# Patient Record
Sex: MALE | Race: BLACK OR AFRICAN AMERICAN | NOT HISPANIC OR LATINO | URBAN - METROPOLITAN AREA
[De-identification: names, ages, dates, MRNs, and addresses within clinical notes are randomized per-mention and may not be internally consistent; named-entity substitution may affect disease eponyms.]

---

## 2018-01-16 ENCOUNTER — INPATIENT (INPATIENT)
Facility: HOSPITAL | Age: 28
LOS: 1 days | Discharge: HOME | End: 2018-01-18
Attending: INTERNAL MEDICINE | Admitting: INTERNAL MEDICINE
Payer: MEDICAID

## 2018-01-16 VITALS
TEMPERATURE: 98 F | WEIGHT: 250 LBS | SYSTOLIC BLOOD PRESSURE: 190 MMHG | RESPIRATION RATE: 20 BRPM | HEIGHT: 73 IN | HEART RATE: 108 BPM | DIASTOLIC BLOOD PRESSURE: 122 MMHG | OXYGEN SATURATION: 99 %

## 2018-01-16 DIAGNOSIS — M54.16 RADICULOPATHY, LUMBAR REGION: ICD-10-CM

## 2018-01-16 DIAGNOSIS — I16.0 HYPERTENSIVE URGENCY: ICD-10-CM

## 2018-01-16 DIAGNOSIS — R74.8 ABNORMAL LEVELS OF OTHER SERUM ENZYMES: ICD-10-CM

## 2018-01-16 LAB
ALBUMIN SERPL ELPH-MCNC: 4.1 G/DL — SIGNIFICANT CHANGE UP (ref 3.3–5)
ALP SERPL-CCNC: 84 U/L — SIGNIFICANT CHANGE UP (ref 40–120)
ALT FLD-CCNC: 38 U/L — SIGNIFICANT CHANGE UP (ref 12–78)
AMPHET UR-MCNC: NEGATIVE — SIGNIFICANT CHANGE UP
ANION GAP SERPL CALC-SCNC: 7 MMOL/L — SIGNIFICANT CHANGE UP (ref 5–17)
APPEARANCE UR: CLEAR — SIGNIFICANT CHANGE UP
APTT BLD: 31.3 SEC — SIGNIFICANT CHANGE UP (ref 27.5–37.4)
AST SERPL-CCNC: 38 U/L — HIGH (ref 15–37)
BARBITURATES UR SCN-MCNC: NEGATIVE — SIGNIFICANT CHANGE UP
BASOPHILS # BLD AUTO: 0.1 K/UL — SIGNIFICANT CHANGE UP (ref 0–0.2)
BASOPHILS NFR BLD AUTO: 1.4 % — SIGNIFICANT CHANGE UP (ref 0–2)
BENZODIAZ UR-MCNC: NEGATIVE — SIGNIFICANT CHANGE UP
BILIRUB SERPL-MCNC: 0.8 MG/DL — SIGNIFICANT CHANGE UP (ref 0.2–1.2)
BILIRUB UR-MCNC: NEGATIVE — SIGNIFICANT CHANGE UP
BLD GP AB SCN SERPL QL: SIGNIFICANT CHANGE UP
BUN SERPL-MCNC: 12 MG/DL — SIGNIFICANT CHANGE UP (ref 7–23)
CALCIUM SERPL-MCNC: 8.8 MG/DL — SIGNIFICANT CHANGE UP (ref 8.5–10.1)
CHLORIDE SERPL-SCNC: 103 MMOL/L — SIGNIFICANT CHANGE UP (ref 96–108)
CK MB BLD-MCNC: 0.1 % — SIGNIFICANT CHANGE UP (ref 0–3.5)
CK MB CFR SERPL CALC: 1.5 NG/ML — SIGNIFICANT CHANGE UP (ref 0.5–3.6)
CK SERPL-CCNC: 1331 U/L — HIGH (ref 26–308)
CO2 SERPL-SCNC: 30 MMOL/L — SIGNIFICANT CHANGE UP (ref 22–31)
COCAINE METAB.OTHER UR-MCNC: NEGATIVE — SIGNIFICANT CHANGE UP
COLOR SPEC: YELLOW — SIGNIFICANT CHANGE UP
CREAT SERPL-MCNC: 1.2 MG/DL — SIGNIFICANT CHANGE UP (ref 0.5–1.3)
DIFF PNL FLD: ABNORMAL
EOSINOPHIL # BLD AUTO: 0.1 K/UL — SIGNIFICANT CHANGE UP (ref 0–0.5)
EOSINOPHIL NFR BLD AUTO: 1.8 % — SIGNIFICANT CHANGE UP (ref 0–6)
EPI CELLS # UR: SIGNIFICANT CHANGE UP
GLUCOSE SERPL-MCNC: 112 MG/DL — HIGH (ref 70–99)
GLUCOSE UR QL: NEGATIVE MG/DL — SIGNIFICANT CHANGE UP
HCT VFR BLD CALC: 43.7 % — SIGNIFICANT CHANGE UP (ref 39–50)
HGB BLD-MCNC: 14.2 G/DL — SIGNIFICANT CHANGE UP (ref 13–17)
INR BLD: 1.18 RATIO — HIGH (ref 0.88–1.16)
KETONES UR-MCNC: NEGATIVE — SIGNIFICANT CHANGE UP
LEUKOCYTE ESTERASE UR-ACNC: NEGATIVE — SIGNIFICANT CHANGE UP
LYMPHOCYTES # BLD AUTO: 1.6 K/UL — SIGNIFICANT CHANGE UP (ref 1–3.3)
LYMPHOCYTES # BLD AUTO: 30.2 % — SIGNIFICANT CHANGE UP (ref 13–44)
MCHC RBC-ENTMCNC: 26.3 PG — LOW (ref 27–34)
MCHC RBC-ENTMCNC: 32.4 GM/DL — SIGNIFICANT CHANGE UP (ref 32–36)
MCV RBC AUTO: 81.1 FL — SIGNIFICANT CHANGE UP (ref 80–100)
METHADONE UR-MCNC: NEGATIVE — SIGNIFICANT CHANGE UP
MONOCYTES # BLD AUTO: 0.4 K/UL — SIGNIFICANT CHANGE UP (ref 0–0.9)
MONOCYTES NFR BLD AUTO: 7.9 % — SIGNIFICANT CHANGE UP (ref 2–14)
NEUTROPHILS # BLD AUTO: 3.2 K/UL — SIGNIFICANT CHANGE UP (ref 1.8–7.4)
NEUTROPHILS NFR BLD AUTO: 58.8 % — SIGNIFICANT CHANGE UP (ref 43–77)
NITRITE UR-MCNC: NEGATIVE — SIGNIFICANT CHANGE UP
OPIATES UR-MCNC: NEGATIVE — SIGNIFICANT CHANGE UP
PCP SPEC-MCNC: SIGNIFICANT CHANGE UP
PCP UR-MCNC: NEGATIVE — SIGNIFICANT CHANGE UP
PH UR: 7 — SIGNIFICANT CHANGE UP (ref 5–8)
PLATELET # BLD AUTO: 346 K/UL — SIGNIFICANT CHANGE UP (ref 150–400)
POTASSIUM SERPL-MCNC: 3.2 MMOL/L — LOW (ref 3.5–5.3)
POTASSIUM SERPL-SCNC: 3.2 MMOL/L — LOW (ref 3.5–5.3)
PROT SERPL-MCNC: 8.7 GM/DL — HIGH (ref 6–8.3)
PROT UR-MCNC: NEGATIVE MG/DL — SIGNIFICANT CHANGE UP
PROTHROM AB SERPL-ACNC: 12.9 SEC — HIGH (ref 9.8–12.7)
RBC # BLD: 5.39 M/UL — SIGNIFICANT CHANGE UP (ref 4.2–5.8)
RBC # FLD: 13.5 % — SIGNIFICANT CHANGE UP (ref 11–15)
RBC CASTS # UR COMP ASSIST: SIGNIFICANT CHANGE UP /HPF (ref 0–4)
SODIUM SERPL-SCNC: 140 MMOL/L — SIGNIFICANT CHANGE UP (ref 135–145)
SP GR SPEC: 1 — LOW (ref 1.01–1.02)
THC UR QL: NEGATIVE — SIGNIFICANT CHANGE UP
TROPONIN I SERPL-MCNC: 0.11 NG/ML — HIGH (ref 0.01–0.04)
UROBILINOGEN FLD QL: NEGATIVE MG/DL — SIGNIFICANT CHANGE UP
WBC # BLD: 5.4 K/UL — SIGNIFICANT CHANGE UP (ref 3.8–10.5)
WBC # FLD AUTO: 5.4 K/UL — SIGNIFICANT CHANGE UP (ref 3.8–10.5)

## 2018-01-16 PROCEDURE — 71045 X-RAY EXAM CHEST 1 VIEW: CPT | Mod: 26

## 2018-01-16 PROCEDURE — 70450 CT HEAD/BRAIN W/O DYE: CPT | Mod: 26

## 2018-01-16 PROCEDURE — 93010 ELECTROCARDIOGRAM REPORT: CPT

## 2018-01-16 PROCEDURE — 99285 EMERGENCY DEPT VISIT HI MDM: CPT

## 2018-01-16 PROCEDURE — 99223 1ST HOSP IP/OBS HIGH 75: CPT

## 2018-01-16 PROCEDURE — 74176 CT ABD & PELVIS W/O CONTRAST: CPT | Mod: 26

## 2018-01-16 RX ORDER — LABETALOL HCL 100 MG
10 TABLET ORAL ONCE
Refills: 0 | Status: COMPLETED | OUTPATIENT
Start: 2018-01-16 | End: 2018-01-16

## 2018-01-16 RX ORDER — IBUPROFEN 200 MG
600 TABLET ORAL EVERY 8 HOURS
Refills: 0 | Status: DISCONTINUED | OUTPATIENT
Start: 2018-01-16 | End: 2018-01-18

## 2018-01-16 RX ORDER — POTASSIUM CHLORIDE 20 MEQ
40 PACKET (EA) ORAL ONCE
Refills: 0 | Status: COMPLETED | OUTPATIENT
Start: 2018-01-16 | End: 2018-01-16

## 2018-01-16 RX ORDER — AMLODIPINE BESYLATE 2.5 MG/1
10 TABLET ORAL DAILY
Refills: 0 | Status: DISCONTINUED | OUTPATIENT
Start: 2018-01-16 | End: 2018-01-18

## 2018-01-16 RX ORDER — IBUPROFEN 200 MG
600 TABLET ORAL ONCE
Refills: 0 | Status: COMPLETED | OUTPATIENT
Start: 2018-01-16 | End: 2018-01-16

## 2018-01-16 RX ORDER — ASPIRIN/CALCIUM CARB/MAGNESIUM 324 MG
325 TABLET ORAL ONCE
Refills: 0 | Status: COMPLETED | OUTPATIENT
Start: 2018-01-16 | End: 2018-01-16

## 2018-01-16 RX ORDER — METHOCARBAMOL 500 MG/1
1 TABLET, FILM COATED ORAL
Qty: 15 | Refills: 0
Start: 2018-01-16 | End: 2018-01-20

## 2018-01-16 RX ORDER — IBUPROFEN 200 MG
1 TABLET ORAL
Qty: 15 | Refills: 0
Start: 2018-01-16 | End: 2018-01-20

## 2018-01-16 RX ORDER — METHOCARBAMOL 500 MG/1
1500 TABLET, FILM COATED ORAL ONCE
Refills: 0 | Status: COMPLETED | OUTPATIENT
Start: 2018-01-16 | End: 2018-01-16

## 2018-01-16 RX ORDER — AMLODIPINE BESYLATE 2.5 MG/1
10 TABLET ORAL ONCE
Refills: 0 | Status: COMPLETED | OUTPATIENT
Start: 2018-01-16 | End: 2018-01-16

## 2018-01-16 RX ORDER — METHOCARBAMOL 500 MG/1
750 TABLET, FILM COATED ORAL THREE TIMES A DAY
Refills: 0 | Status: DISCONTINUED | OUTPATIENT
Start: 2018-01-16 | End: 2018-01-18

## 2018-01-16 RX ORDER — OXYCODONE AND ACETAMINOPHEN 5; 325 MG/1; MG/1
1 TABLET ORAL ONCE
Refills: 0 | Status: DISCONTINUED | OUTPATIENT
Start: 2018-01-16 | End: 2018-01-16

## 2018-01-16 RX ADMIN — Medication 40 MILLIEQUIVALENT(S): at 23:04

## 2018-01-16 RX ADMIN — Medication 0.1 MILLIGRAM(S): at 12:49

## 2018-01-16 RX ADMIN — AMLODIPINE BESYLATE 10 MILLIGRAM(S): 2.5 TABLET ORAL at 16:50

## 2018-01-16 RX ADMIN — METHOCARBAMOL 1500 MILLIGRAM(S): 500 TABLET, FILM COATED ORAL at 12:29

## 2018-01-16 RX ADMIN — Medication 10 MILLIGRAM(S): at 17:22

## 2018-01-16 RX ADMIN — Medication 0.1 MILLIGRAM(S): at 18:59

## 2018-01-16 RX ADMIN — METHOCARBAMOL 750 MILLIGRAM(S): 500 TABLET, FILM COATED ORAL at 23:03

## 2018-01-16 RX ADMIN — Medication 600 MILLIGRAM(S): at 23:13

## 2018-01-16 RX ADMIN — Medication 600 MILLIGRAM(S): at 14:14

## 2018-01-16 RX ADMIN — Medication 325 MILLIGRAM(S): at 17:19

## 2018-01-16 RX ADMIN — AMLODIPINE BESYLATE 10 MILLIGRAM(S): 2.5 TABLET ORAL at 20:09

## 2018-01-16 RX ADMIN — Medication 10 MILLIGRAM(S): at 15:50

## 2018-01-16 RX ADMIN — Medication 600 MILLIGRAM(S): at 12:29

## 2018-01-16 NOTE — H&P ADULT - NSHPLABSRESULTS_GEN_ALL_CORE
14.2   5.4   )-----------( 346      ( 16 Jan 2018 14:37 )             43.7   01-16    140  |  103  |  12  ----------------------------<  112<H>  3.2<L>   |  30  |  1.20    Ca    8.8      16 Jan 2018 14:37    TPro  8.7<H>  /  Alb  4.1  /  TBili  0.8  /  DBili  x   /  AST  38<H>  /  ALT  38  /  AlkPhos  84  01-16  < from: Xray Chest 1 View AP/PA. (01.16.18 @ 16:15) >    IMPRESSION: No acute findings.    < from: CT Renal Stone Hunt (01.16.18 @ 15:39) >      No urolithiasis or hydronephrosis.  ekg- lvh

## 2018-01-16 NOTE — H&P ADULT - ASSESSMENT
27m with flank pain with accelerated Hypertension     IMPROVE VTE Individual Risk Assessment          RISK                                                          Points    [  ] Previous VTE                                                3    [  ] Thrombophilia                                             2    [  ] Lower limb paralysis                                    2        (unable to hold up >15 seconds)      [  ] Current Cancer                                             2         (within 6 months)    [  ] Immobilization > 24 hrs                              1    [  ] ICU/CCU stay > 24 hours                            1    [  ] Age > 60                                                    1    IMPROVE VTE Score _____0____

## 2018-01-16 NOTE — ED ADULT NURSE NOTE - OBJECTIVE STATEMENT
pt received alert and oriented x 4 , pt c/o lower back pain after sneezing and having cold for few days . pt denies medical hx, medication hx .

## 2018-01-16 NOTE — ED ADULT TRIAGE NOTE - CHIEF COMPLAINT QUOTE
patient c/o of lower back pain radiating in the  L legs started 10 days ago , patient denied dysuria no blood in urine , no cough no fever , patient stated he is anxious at this time

## 2018-01-16 NOTE — H&P ADULT - HISTORY OF PRESENT ILLNESS
: 27 years old male walked in c/o left side lower back pain radiates down to this left buttock and left thigh for three days and intensity of pain with movements. Pt denies recent trauma, focal/distal weakness or numbness, uncontrolled urinations or bowel movements, headache, dizziness, blurred visions, light sensitivities, cough, sob, chest pain nausea, vomiting, fever, chills, abd pain, dysuria, hematuria, or irregular bowel movements. in the emergency room he was found to be in Hypertensive emergency

## 2018-01-16 NOTE — ED ADULT NURSE REASSESSMENT NOTE - NS ED NURSE REASSESS COMMENT FT1
Pt appears to be sleeping, VSS, htn,,  AFEBRILE CALL GARNER IN REACH, SAFETY MAINTAINED PT PLACED ON TELE , addressed htn c Dr. Caicedo, no new orders at this time, will continue to monitor.

## 2018-01-16 NOTE — ED PROVIDER NOTE - OBJECTIVE STATEMENT
27 years old male walked in c/o left side lower back pain radiates down to this left buttock and left thigh for three days and intensity of pain with movements. Pt denies recent trauma, focal/distal weakness or numbness, uncontrolled urinations or bowel movements, headache, dizziness, blurred visions, light sensitivities, cough, sob, chest pain nausea, vomiting, fever, chills, abd pain, dysuria, hematuria, or irregular bowel movements.

## 2018-01-16 NOTE — ED PROVIDER NOTE - PROGRESS NOTE DETAILS
bp 202/116 but denies headache, dizziness, blurred visions, light sensitivities, focal/distal weakness or numbness, sob, cough, chest pain, nausea, vomiting, abd pain. Pt sts he does not has hx of hypertension and sts he always gets nervous when he sees a doctor. Pt is still alert and oriented x 3 smiling sts he has no pain or weakness or dizziness bp 180/113 hr 90 now. Dr. Santoro is notified and admit pt.

## 2018-01-16 NOTE — ED PROVIDER NOTE - CONSTITUTIONAL, MLM
normal... Well appearing, well nourished, awake, alert, oriented to person, place, time/situation and in no apparent distress. Speaking in clear full sentences no nasal flaring no shoulders retractions, smiling appear very comfortable

## 2018-01-16 NOTE — ED ADULT NURSE REASSESSMENT NOTE - NS ED NURSE REASSESS COMMENT FT1
pt persist with elevated blood pressure, MD ELLIOTT is aware, blood work and iv started , pt to go for cta,.

## 2018-01-16 NOTE — H&P ADULT - FAMILY HISTORY
Mother  Still living? Unknown  Family history of hypertension, Age at diagnosis: Age Unknown     Sibling  Still living? Unknown  Family history of hypertension, Age at diagnosis: Age Unknown     Uncle  Still living? Unknown  Family history of hypertension, Age at diagnosis: Age Unknown

## 2018-01-16 NOTE — ED PROVIDER NOTE - CARE PLAN
Principal Discharge DX:	Lumbar radiculopathy Principal Discharge DX:	Hypertensive urgency  Secondary Diagnosis:	Lumbar radiculopathy

## 2018-01-17 DIAGNOSIS — E66.9 OBESITY, UNSPECIFIED: ICD-10-CM

## 2018-01-17 LAB
HBA1C BLD-MCNC: 5.7 % — HIGH (ref 4–5.6)
TROPONIN I SERPL-MCNC: 0.1 NG/ML — HIGH (ref 0.01–0.04)
TROPONIN I SERPL-MCNC: 0.11 NG/ML — HIGH (ref 0.01–0.04)
TSH SERPL-MCNC: 1.56 UIU/ML — SIGNIFICANT CHANGE UP (ref 0.36–3.74)

## 2018-01-17 PROCEDURE — 99233 SBSQ HOSP IP/OBS HIGH 50: CPT

## 2018-01-17 PROCEDURE — 93306 TTE W/DOPPLER COMPLETE: CPT | Mod: 26

## 2018-01-17 RX ORDER — HYDROCHLOROTHIAZIDE 25 MG
25 TABLET ORAL DAILY
Refills: 0 | Status: DISCONTINUED | OUTPATIENT
Start: 2018-01-17 | End: 2018-01-18

## 2018-01-17 RX ADMIN — Medication 0.2 MILLIGRAM(S): at 22:42

## 2018-01-17 RX ADMIN — Medication 0.1 MILLIGRAM(S): at 05:34

## 2018-01-17 RX ADMIN — METHOCARBAMOL 750 MILLIGRAM(S): 500 TABLET, FILM COATED ORAL at 14:06

## 2018-01-17 RX ADMIN — AMLODIPINE BESYLATE 10 MILLIGRAM(S): 2.5 TABLET ORAL at 05:34

## 2018-01-17 RX ADMIN — METHOCARBAMOL 750 MILLIGRAM(S): 500 TABLET, FILM COATED ORAL at 22:42

## 2018-01-17 RX ADMIN — Medication 25 MILLIGRAM(S): at 11:12

## 2018-01-17 RX ADMIN — Medication 600 MILLIGRAM(S): at 21:10

## 2018-01-17 RX ADMIN — Medication 600 MILLIGRAM(S): at 08:00

## 2018-01-17 RX ADMIN — Medication 0.2 MILLIGRAM(S): at 20:14

## 2018-01-17 RX ADMIN — METHOCARBAMOL 750 MILLIGRAM(S): 500 TABLET, FILM COATED ORAL at 05:34

## 2018-01-17 RX ADMIN — Medication 600 MILLIGRAM(S): at 20:14

## 2018-01-17 RX ADMIN — Medication 0.1 MILLIGRAM(S): at 14:06

## 2018-01-17 NOTE — CONSULT NOTE ADULT - SUBJECTIVE AND OBJECTIVE BOX
HPI:  HPI:  : 27 years old male walked in c/o left side lower back pain radiates down to this left buttock and left thigh for three days and intensity of pain with movements. Pt denies recent trauma, focal/distal weakness or numbness, uncontrolled urinations or bowel movements, headache, dizziness, blurred visions, light sensitivities, cough, sob, chest pain nausea, vomiting, fever, chills, abd pain, dysuria, hematuria, or irregular bowel movements. in the emergency room he was found to be in Hypertensive emergency (2018 18:10)      Chief Complaint:  Patient is a 27y old  Male who presents with a chief complaint of flank pain (2018 18:10)      Review of Systems:    see above           Social History/Family History  SOCHX:   tobacco,  -  alcohol    FMHX: FA/MO  - contributory       Discussed with:  PMD, Family    Physical Exam:    Vital Signs:  Vital Signs Last 24 Hrs  T(C): 36.6 (2018 04:38), Max: 37.2 (2018 00:34)  T(F): 97.8 (2018 04:38), Max: 98.9 (2018 00:34)  HR: 92 (2018 06:53) (89 - 118)  BP: 138/81 (2018 06:53) (133/79 - 186/115)  BP(mean): --  RR: 16 (2018 06:53) (16 - 18)  SpO2: 100% (2018 06:53) (99% - 100%)  Daily     Daily Weight in k (2018 04:38)  I&O's Summary    2018 07:01  -  2018 07:00  --------------------------------------------------------  IN: 1520 mL / OUT: 0 mL / NET: 1520 mL          Chest:  Full & symmetric excursion, no increased effort, breath sounds clear  Cardiovascular:  Regular rhythm, S1, S2, no murmur/rub/S3/S4, no carotid/femoral/abdominal bruit, radial/pedal pulses 2+, no edema  Abdomen:  Soft, non-tender, non-distended, normoactive bowel sounds, no HSM      Laboratory:                          14.2   5.4   )-----------( 346      ( 2018 14:37 )             43.7     -16    140  |  103  |  12  ----------------------------<  112<H>  3.2<L>   |  30  |  1.20    Ca    8.8      2018 14:37    TPro  8.7<H>  /  Alb  4.1  /  TBili  0.8  /  DBili  x   /  AST  38<H>  /  ALT  38  /  AlkPhos  84        CARDIAC MARKERS ( 2018 16:18 )  .080 ng/mL / x     / x     / x     / x      CARDIAC MARKERS ( 2018 08:52 )  .102 ng/mL / x     / x     / x     / x      CARDIAC MARKERS ( 2018 00:24 )  .114 ng/mL / x     / x     / x     / x      CARDIAC MARKERS ( 2018 16:26 )  .110 ng/mL / x     / 1331 U/L / x     / 1.5 ng/mL      CAPILLARY BLOOD GLUCOSE        LIVER FUNCTIONS - ( 2018 14:37 )  Alb: 4.1 g/dL / Pro: 8.7 gm/dL / ALK PHOS: 84 U/L / ALT: 38 U/L / AST: 38 U/L / GGT: x           PT/INR - ( 2018 14:37 )   PT: 12.9 sec;   INR: 1.18 ratio         PTT - ( 2018 14:37 )  PTT:31.3 sec  Urinalysis Basic - ( 2018 17:36 )    Color: Yellow / Appearance: Clear / S.005 / pH: x  Gluc: x / Ketone: Negative  / Bili: Negative / Urobili: Negative mg/dL   Blood: x / Protein: Negative mg/dL / Nitrite: Negative   Leuk Esterase: Negative / RBC: Occasional /HPF / WBC x   Sq Epi: x / Non Sq Epi: Occasional / Bacteria: x      Assessment:  HTN , elevated SBP  trivial troponin leak  Await echo, if normal EF and no wall motion changes , no in patient stress test, out patient stress test acceptable  Young patient with likely demand ischemia in setting of elevated SBP and CPK.   Await TTE

## 2018-01-17 NOTE — PROGRESS NOTE ADULT - SUBJECTIVE AND OBJECTIVE BOX
Patient is a 27y old  Male who presents with a chief complaint of flank pain (2018 18:10)      INTERVAL HPI/OVERNIGHT EVENTS: no events overnight    MEDICATIONS  (STANDING):  amLODIPine   Tablet 10 milliGRAM(s) Oral daily  cloNIDine 0.1 milliGRAM(s) Oral every 8 hours  hydrochlorothiazide 25 milliGRAM(s) Oral daily  methocarbamol 750 milliGRAM(s) Oral three times a day    MEDICATIONS  (PRN):  ibuprofen  Tablet 600 milliGRAM(s) Oral every 8 hours PRN pain      Allergies    No Known Allergies    Intolerances            Vital Signs Last 24 Hrs  T(C): 36.8 (2018 12:05), Max: 36.8 (2018 21:56)  T(F): 98.3 (2018 12:05), Max: 98.3 (2018 22:40)  HR: 118 (2018 12:05) (82 - 118)  BP: 175/114 (2018 12:05) (175/114 - 217/138)  BP(mean): --  RR: 16 (2018 12:05) (15 - 18)  SpO2: 99% (2018 12:05) (97% - 100%)    PHYSICAL EXAM:  GENERAL: NAD, well-groomed, well-developed  HEAD:  Atraumatic, Normocephalic  EYES: EOMI, PERRLA, conjunctiva and sclera clear  ENMT: No tonsillar erythema, exudates, or enlargement; Moist mucous membranes, Good dentition, No lesions  NECK: Supple, No JVD, Normal thyroid  NERVOUS SYSTEM:  Alert & Oriented X3, Good concentration; Motor Strength 5/5 B/L upper and lower extremities; DTRs 2+ intact and symmetric  CHEST/LUNG: Clear to percussion bilaterally; No rales, rhonchi, wheezing, or rubs  HEART: Regular rate and rhythm; No murmurs, rubs, or gallops  ABDOMEN: Soft, Nontender, Nondistended; Bowel sounds present  EXTREMITIES:  2+ Peripheral Pulses, No clubbing, cyanosis, or edema  LYMPH: No lymphadenopathy noted  SKIN: No rashes or lesions    LABS:                        14.2   5.4   )-----------( 346      ( 2018 14:37 )             43.7     01-16    140  |  103  |  12  ----------------------------<  112<H>  3.2<L>   |  30  |  1.20    Ca    8.8      2018 14:37    TPro  8.7<H>  /  Alb  4.1  /  TBili  0.8  /  DBili  x   /  AST  38<H>  /  ALT  38  /  AlkPhos  84  01-16    PT/INR - ( 2018 14:37 )   PT: 12.9 sec;   INR: 1.18 ratio         PTT - ( 2018 14:37 )  PTT:31.3 sec  Urinalysis Basic - ( 2018 17:36 )    Color: Yellow / Appearance: Clear / S.005 / pH: x  Gluc: x / Ketone: Negative  / Bili: Negative / Urobili: Negative mg/dL   Blood: x / Protein: Negative mg/dL / Nitrite: Negative   Leuk Esterase: Negative / RBC: Occasional /HPF / WBC x   Sq Epi: x / Non Sq Epi: Occasional / Bacteria: x      CAPILLARY BLOOD GLUCOSE          RADIOLOGY & ADDITIONAL TESTS:    Imaging Personally Reviewed:  [ ] YES  [ ] NO    Consultant(s) Notes Reviewed:  [ ] YES  [ ] NO    Care Discussed with Consultants/Other Providers [ ] YES  [ ] NO

## 2018-01-18 ENCOUNTER — EMERGENCY (EMERGENCY)
Facility: HOSPITAL | Age: 28
LOS: 0 days | Discharge: HOME | End: 2018-01-18
Attending: EMERGENCY MEDICINE
Payer: MEDICAID

## 2018-01-18 ENCOUNTER — TRANSCRIPTION ENCOUNTER (OUTPATIENT)
Age: 28
End: 2018-01-18

## 2018-01-18 VITALS
SYSTOLIC BLOOD PRESSURE: 137 MMHG | WEIGHT: 255.07 LBS | OXYGEN SATURATION: 99 % | HEIGHT: 73 IN | TEMPERATURE: 98 F | DIASTOLIC BLOOD PRESSURE: 84 MMHG | HEART RATE: 88 BPM | RESPIRATION RATE: 16 BRPM

## 2018-01-18 VITALS
RESPIRATION RATE: 16 BRPM | SYSTOLIC BLOOD PRESSURE: 111 MMHG | TEMPERATURE: 97 F | DIASTOLIC BLOOD PRESSURE: 61 MMHG | OXYGEN SATURATION: 100 % | HEART RATE: 89 BPM

## 2018-01-18 VITALS
HEART RATE: 98 BPM | DIASTOLIC BLOOD PRESSURE: 83 MMHG | OXYGEN SATURATION: 99 % | RESPIRATION RATE: 16 BRPM | TEMPERATURE: 99 F | SYSTOLIC BLOOD PRESSURE: 137 MMHG

## 2018-01-18 DIAGNOSIS — G58.9 MONONEUROPATHY, UNSPECIFIED: ICD-10-CM

## 2018-01-18 DIAGNOSIS — R20.0 ANESTHESIA OF SKIN: ICD-10-CM

## 2018-01-18 DIAGNOSIS — I10 ESSENTIAL (PRIMARY) HYPERTENSION: ICD-10-CM

## 2018-01-18 LAB — GLUCOSE BLDC GLUCOMTR-MCNC: 95 MG/DL — SIGNIFICANT CHANGE UP (ref 70–99)

## 2018-01-18 PROCEDURE — 99239 HOSP IP/OBS DSCHRG MGMT >30: CPT

## 2018-01-18 PROCEDURE — 93010 ELECTROCARDIOGRAM REPORT: CPT

## 2018-01-18 PROCEDURE — 99284 EMERGENCY DEPT VISIT MOD MDM: CPT

## 2018-01-18 RX ORDER — AMLODIPINE BESYLATE 2.5 MG/1
1 TABLET ORAL
Qty: 30 | Refills: 0
Start: 2018-01-18 | End: 2018-02-16

## 2018-01-18 RX ADMIN — Medication 600 MILLIGRAM(S): at 06:30

## 2018-01-18 RX ADMIN — METHOCARBAMOL 750 MILLIGRAM(S): 500 TABLET, FILM COATED ORAL at 05:35

## 2018-01-18 RX ADMIN — AMLODIPINE BESYLATE 10 MILLIGRAM(S): 2.5 TABLET ORAL at 05:35

## 2018-01-18 RX ADMIN — Medication 0.2 MILLIGRAM(S): at 06:53

## 2018-01-18 RX ADMIN — Medication 600 MILLIGRAM(S): at 05:35

## 2018-01-18 RX ADMIN — Medication 25 MILLIGRAM(S): at 05:35

## 2018-01-18 NOTE — ED PROVIDER NOTE - MEDICAL DECISION MAKING DETAILS
Patient with right arm weakness/numbness after sleeping on arm, symptoms resolved.  VSS.  Symptoms consistent with nerve compression, patient does not describe clear symptoms of wrist drop, but based on events, there in minimal suspicion for TIA/CVA.  EKG unchanged from previous admission, no arrhythmia, blood glucose wnl.  Patient advised to monitor for any further neurologic symptoms and should return immediately for any unprovoked numbness or weakness, but for now feel that he is safe to discharge. Patient with right arm weakness/numbness after sleeping on arm, symptoms resolved.  VSS.  Symptoms consistent with nerve compression, patient does not describe clear symptoms of wrist drop, but based on events, there in minimal suspicion for TIA/CVA.  EKG without arrhythmia, blood glucose wnl.  Patient advised to monitor for any further neurologic symptoms and should return immediately for any unprovoked numbness or weakness, but for now feel that he is safe to discharge. Patient with right arm weakness/numbness after sleeping on arm, symptoms resolved.  VSS.  Symptoms consistent with nerve compression, patient does not describe clear symptoms of wrist drop, but still very likely had pinched nerve based on events, there in minimal suspicion for TIA/CVA.  EKG without arrhythmia, blood glucose wnl.  Patient advised to monitor for any further neurologic symptoms and should return immediately for any unprovoked numbness or weakness, but for now feel that he is safe to discharge.

## 2018-01-18 NOTE — DISCHARGE NOTE ADULT - CARE PROVIDER_API CALL
Lele Arenas), Internal Medicine  [address] [facility]  [address]  [place] [address]  Phone: [phone]  Fax: [phone]    Manoj Painter), Cardiology  [address] [facility]  [address] [place] [address]  Phone: [phone]  Fax: [phone]

## 2018-01-18 NOTE — PROGRESS NOTE ADULT - SUBJECTIVE AND OBJECTIVE BOX
Assessment:  HTN , elevated SBP  trivial troponin leak  Await echo, if normal EF and no wall motion changes , no in patient stress test, out patient stress test acceptable  Young patient with likely demand ischemia in setting of elevated SBP and CPK.   TTE normal  DC

## 2018-01-18 NOTE — DISCHARGE NOTE ADULT - MEDICATION SUMMARY - MEDICATIONS TO TAKE
I will START or STAY ON the medications listed below when I get home from the hospital:     mg oral tablet  -- 1 tab(s) by mouth every 8 hours as needed for pain  -- Do not take this drug if you are pregnant.  It is very important that you take or use this exactly as directed.  Do not skip doses or discontinue unless directed by your doctor.  May cause drowsiness or dizziness.  Obtain medical advice before taking any non-prescription drugs as some may affect the action of this medication.  Take with food or milk.    -- Indication: For Lumbar radiculopathy    cloNIDine 0.2 mg oral tablet  -- 1 tab(s) by mouth every 8 hours  -- Indication: For Hypertensive urgency    amLODIPine 10 mg oral tablet  -- 1 tab(s) by mouth once a day  -- Indication: For Hypertensive urgency    hydroCHLOROthiazide 25 mg oral tablet  -- 1 tab(s) by mouth once a day  -- Indication: For Hypertensive urgency    Robaxin-750 oral tablet  -- 1 tab(s) by mouth 3 times a day   -- May cause drowsiness.  Alcohol may intensify this effect.  Use care when operating dangerous machinery.    -- Indication: For Lumbar radiculopathy

## 2018-01-18 NOTE — ED PROVIDER NOTE - OBJECTIVE STATEMENT
Pertinent PMH/PSH/FHx/SHx and Review of Systems contained within:  Patient presents to the ED for right arm numbness.  Patient is a well appearing male, recently admitted for lower back pain and new onset HTN.  Patient says that he was discharged this morning (results of recent admission reviewed, labs, echo, CT head).  Went home and took robaxin for lower back pain which made him sleepy.  Says that he had been sleeping on his right side compressing his right arm.  On waking up felt numbness and weakness of the right arm, was able to flex at elbow, but hand and wrist felt weak, had difficulty flexing AND extending at the wrist and moving his fingers.  Sensation lasted about 20 minutes before resolving.  Denies any other weakness, numbness, speech difficulty, headache, or vision change.  Says that he panicked and came back to ER.    Relevant PMHx/SHx/SOCHx/FAMH:  HTN, anxiety  Patient denies EtOH/tobacco/illicit substance use.  No PMD    ROS: No fever/chills, No headache/photophobia/eye pain/changes in vision, No ear pain/sore throat/dysphagia, No chest pain/palpitations, no SOB/cough/wheeze/stridor, No abdominal pain, No N/V/D/melena, no dysuria/frequency/discharge, No neck/back pain, no rash, no changes in neurological status/function.

## 2018-01-18 NOTE — ED PROVIDER NOTE - PHYSICAL EXAMINATION
Gen: Alert, NAD, well appearing  Head: NC, AT, PERRL, EOMI, normal lids/conjunctiva  ENT: normal hearing, patent oropharynx without erythema/exudate, uvula midline  Neck: +supple, no tenderness/meningismus/JVD, +Trachea midline  Pulm: Bilateral BS, normal resp effort, no wheeze/stridor/retractions  CV: RRR, no M/R/G, +dist pulses  Abd: soft, NT/ND, +BS, no hepatosplenomegaly  Mskel: no edema/erythema/cyanosis  Skin: no rash, warm/dry

## 2018-01-18 NOTE — DISCHARGE NOTE ADULT - PLAN OF CARE
c/w medication, get bp machine from pharmacy measure your pressure daily c/w medication, get bp machine from pharmacy measure your pressure daily. Bring log to pcp  f/u pcp, heart healthy diet

## 2018-01-18 NOTE — DISCHARGE NOTE ADULT - CARE PLAN
Principal Discharge DX:	Hypertensive urgency  Goal:	c/w medication, get bp machine from pharmacy measure your pressure daily  Assessment and plan of treatment:	c/w medication, get bp machine from pharmacy measure your pressure daily. Bring log to pcp  f/u pcp, heart healthy diet  Secondary Diagnosis:	Elevated troponin  Secondary Diagnosis:	Lumbar radiculopathy  Secondary Diagnosis:	Obesity (BMI 35.0-39.9 without comorbidity)

## 2018-01-18 NOTE — DISCHARGE NOTE ADULT - HOSPITAL COURSE
27m with back pain with accelerated Hypertension          Problem/Plan - 1:  ·  Problem: Hypertensive urgency.  Plan: clonidine  norvasc  hctz   monitor Hypertension at home       Problem/Plan - 2:  ·  Problem: Lumbar radiculopathy.  Plan: motrin/robaxin.      Problem/Plan - 3:  ·  Problem: Elevated troponin.  Plan: monitor trops  echocardiagram  cardio eval.      Problem/Plan - 4:  ·  Problem: Obesity (BMI 35.0-39.9 without comorbidity).  Plan: heart heatlhy diet    Attending Attestation:   I was physically present for the key portions of the evaluation and management (E/M) service provided.  I agree with the above history, physical, and plan which I have reviewed and edited where appropriate.     45 minutes spent on total dc encounter; more than 50% of the visit was spent counseling and/or coordinating care by the attending physician.

## 2018-01-18 NOTE — DISCHARGE NOTE ADULT - PATIENT PORTAL LINK FT
“You can access the Follow[employer] Patient Portal, offered by [facility], by registering with the following website: [email or web address]”

## 2018-01-18 NOTE — ED ADULT TRIAGE NOTE - CHIEF COMPLAINT QUOTE
I got d/c from here today , diagnosis :sciatical and HTN , I went home , slept , I woke up with my right arm numb and I went int panic mode   onset 5: 45 pm today , facial symmetry , no drifting , b/l strength in both extremities . stated history of panic attack /anxiety disorder  Numbness resolved now

## 2018-01-21 LAB
METANEPHRINE, PL: 33 PG/ML — SIGNIFICANT CHANGE UP (ref 0–62)
NORMETANEPHRINE, PL: 105 PG/ML — SIGNIFICANT CHANGE UP (ref 0–145)

## 2018-01-22 DIAGNOSIS — I16.0 HYPERTENSIVE URGENCY: ICD-10-CM

## 2018-01-22 DIAGNOSIS — I24.9 ACUTE ISCHEMIC HEART DISEASE, UNSPECIFIED: ICD-10-CM

## 2018-01-22 DIAGNOSIS — Z82.49 FAMILY HISTORY OF ISCHEMIC HEART DISEASE AND OTHER DISEASES OF THE CIRCULATORY SYSTEM: ICD-10-CM

## 2018-01-22 DIAGNOSIS — M54.16 RADICULOPATHY, LUMBAR REGION: ICD-10-CM

## 2018-01-22 DIAGNOSIS — Z28.21 IMMUNIZATION NOT CARRIED OUT BECAUSE OF PATIENT REFUSAL: ICD-10-CM

## 2018-01-22 DIAGNOSIS — E66.9 OBESITY, UNSPECIFIED: ICD-10-CM

## 2018-01-22 DIAGNOSIS — I10 ESSENTIAL (PRIMARY) HYPERTENSION: ICD-10-CM

## 2018-02-20 ENCOUNTER — APPOINTMENT (OUTPATIENT)
Dept: INTERNAL MEDICINE | Facility: CLINIC | Age: 28
End: 2018-02-20
Payer: SELF-PAY

## 2018-02-20 VITALS
DIASTOLIC BLOOD PRESSURE: 80 MMHG | RESPIRATION RATE: 12 BRPM | SYSTOLIC BLOOD PRESSURE: 148 MMHG | OXYGEN SATURATION: 98 % | HEART RATE: 96 BPM

## 2018-02-20 VITALS — SYSTOLIC BLOOD PRESSURE: 154 MMHG | DIASTOLIC BLOOD PRESSURE: 86 MMHG

## 2018-02-20 VITALS — WEIGHT: 265 LBS | BODY MASS INDEX: 35.12 KG/M2 | HEIGHT: 73 IN

## 2018-02-20 VITALS — SYSTOLIC BLOOD PRESSURE: 150 MMHG | DIASTOLIC BLOOD PRESSURE: 88 MMHG

## 2018-02-20 DIAGNOSIS — Z78.9 OTHER SPECIFIED HEALTH STATUS: ICD-10-CM

## 2018-02-20 DIAGNOSIS — Z82.49 FAMILY HISTORY OF ISCHEMIC HEART DISEASE AND OTHER DISEASES OF THE CIRCULATORY SYSTEM: ICD-10-CM

## 2018-02-20 DIAGNOSIS — R74.8 ABNORMAL LEVELS OF OTHER SERUM ENZYMES: ICD-10-CM

## 2018-02-20 DIAGNOSIS — Z86.59 PERSONAL HISTORY OF OTHER MENTAL AND BEHAVIORAL DISORDERS: ICD-10-CM

## 2018-02-20 DIAGNOSIS — Z83.3 FAMILY HISTORY OF DIABETES MELLITUS: ICD-10-CM

## 2018-02-20 PROCEDURE — 90686 IIV4 VACC NO PRSV 0.5 ML IM: CPT

## 2018-02-20 PROCEDURE — 99204 OFFICE O/P NEW MOD 45 MIN: CPT | Mod: 25

## 2018-02-20 PROCEDURE — G0008: CPT

## 2018-03-19 ENCOUNTER — APPOINTMENT (OUTPATIENT)
Dept: INTERNAL MEDICINE | Facility: CLINIC | Age: 28
End: 2018-03-19
Payer: SELF-PAY

## 2018-03-19 VITALS — DIASTOLIC BLOOD PRESSURE: 60 MMHG | SYSTOLIC BLOOD PRESSURE: 140 MMHG | RESPIRATION RATE: 12 BRPM | HEART RATE: 104 BPM

## 2018-03-19 VITALS — BODY MASS INDEX: 32.59 KG/M2 | WEIGHT: 247 LBS

## 2018-03-19 VITALS — SYSTOLIC BLOOD PRESSURE: 142 MMHG | DIASTOLIC BLOOD PRESSURE: 70 MMHG

## 2018-03-19 PROCEDURE — 99214 OFFICE O/P EST MOD 30 MIN: CPT | Mod: 25

## 2018-03-19 PROCEDURE — 36415 COLL VENOUS BLD VENIPUNCTURE: CPT

## 2018-03-20 LAB
ANION GAP SERPL CALC-SCNC: 19 MMOL/L
BUN SERPL-MCNC: 26 MG/DL
CALCIUM SERPL-MCNC: 10 MG/DL
CHLORIDE SERPL-SCNC: 93 MMOL/L
CO2 SERPL-SCNC: 27 MMOL/L
CREAT SERPL-MCNC: 1.46 MG/DL
GLUCOSE SERPL-MCNC: 98 MG/DL
MAGNESIUM SERPL-MCNC: 2.5 MG/DL
POTASSIUM SERPL-SCNC: 3.9 MMOL/L
SODIUM SERPL-SCNC: 139 MMOL/L

## 2018-05-16 NOTE — ED PROVIDER NOTE - NEUROLOGICAL COORDINATION
After Visit Summary   5/16/2018    Jordan Barnett    MRN: [id number]           Patient Information     Date Of Birth          1963        Visit Information        Provider Department      5/16/2018 9:00 AM Lucita Goetz MD [facility]        Today's Diagnoses     Other fracture of left patella, subsequent encounter for closed fracture with routine healing    -  1      Care Instructions    Plan:  - Today's Plan of Care:  Work Restrictions    Follow Up: as needed    If you have any further questions for your physician or physician s care team you can call [phone] and use option 3 to leave a voice message. Calls received during business hours will be returned same day.            Follow-ups after your visit        Who to contact     If you have questions or need follow up information about today's clinic visit or your schedule please contact [facility] directly at [phone].  Normal or non-critical lab and imaging results will be communicated to you by [employer], letter or phone within 4 business days after the clinic has received the results. If you do not hear from us within 7 days, please contact the clinic through [employer]itekh[employer] or phone. If you have a critical or abnormal lab result, we will notify you by phone as soon as possible.  Submit refill requests through [employer] or call your pharmacy and they will forward the refill request to us. Please allow 3 business days for your refill to be completed.          Additional Information About Your Visit        MyChart Information     [employer] gives you secure access to your electronic health record. If you see a primary care provider, you can also send messages to your care team and make appointments. If you have questions, please call your primary care clinic.  If you do not have a primary care provider, please call [phone] and they will assist you.        Care EveryWhere ID      "This is your Care EveryWhere ID. This could be used by other organizations to access your [facility] medical records  [id number]        Your Vitals Were     Height BMI (Body Mass Index)                5' 10.5\" (1.791 m) 21.46 kg/m2           Blood Pressure from Last 3 Encounters:   05/16/18 118/62   04/04/18 130/80   02/21/18 128/84    Weight from Last 3 Encounters:   05/16/18 151 lb 11.2 oz (68.8 kg)   04/04/18 156 lb 8 oz (71 kg)   02/21/18 160 lb (72.6 kg)               Primary Care Provider Office Phone # Fax #    Juan Antonio Chadd Flanagan [phone] [address] [facility] [address]        Equal Access to Services     SWAPNA REAL : Hadii aad ku hadasho Sovanessa, waaxda luqadaha, qaybta kaalmada adeegyada, shelly salas . So [facility] [phone].    ATENCIÓN: Si habla español, tiene a eaton disposición servicios gratuitos de asistencia lingüística. Avelina al [phone].    We comply with applicable federal civil rights laws and [place] laws. We do not discriminate on the basis of race, color, national origin, age, disability, sex, sexual orientation, or gender identity.            Thank you!     Thank you for choosing [facility]  for your care. Our goal is always to provide you with excellent care. Hearing back from our patients is one way we can continue to improve our services. Please take a few minutes to complete the written survey that you may receive in the mail after your visit with us. Thank you!             Your Updated Medication List - Protect others around you: Learn how to safely use, store and throw away your medicines at www.disposemymeds.org.          This list is accurate as of 5/16/18  9:34 AM.  Always use your most recent med list.                   Brand Name Dispense Instructions for use Diagnosis    GEODON 40 MG capsule   Generic drug:  ziprasidone      Take 2 capsules (80 mg) by mouth At " Bedtime        lamoTRIgine 100 MG tablet    LaMICtal    60 tablet    1 tablet (100 mg) 2 times daily        losartan 100 MG tablet    COZAAR    90 tablet    Take 1 tablet (100 mg) by mouth daily    Essential hypertension with goal blood pressure less than 140/90, CKD (chronic kidney disease) stage 3, GFR 30-59 ml/min       MULTIVITAMIN ADULT PO           order for DME     1 Device    Equipment being ordered: hinged knee brace    Other closed fracture of left patella, initial encounter       triamcinolone 0.1 % ointment    KENALOG    30 g    Apply sparingly to affected area two times daily for 14 days.    Eczema, unspecified type       TYLENOL 500 MG tablet   Generic drug:  acetaminophen      Take 1-2 tablets by mouth every 6 hours as needed.           normal

## 2018-05-30 ENCOUNTER — APPOINTMENT (OUTPATIENT)
Dept: INTERNAL MEDICINE | Facility: CLINIC | Age: 28
End: 2018-05-30
Payer: COMMERCIAL

## 2018-05-30 VITALS — BODY MASS INDEX: 32.32 KG/M2 | WEIGHT: 245 LBS

## 2018-05-30 VITALS — DIASTOLIC BLOOD PRESSURE: 76 MMHG | RESPIRATION RATE: 12 BRPM | SYSTOLIC BLOOD PRESSURE: 138 MMHG | HEART RATE: 120 BPM

## 2018-05-30 VITALS — SYSTOLIC BLOOD PRESSURE: 126 MMHG | HEART RATE: 108 BPM | DIASTOLIC BLOOD PRESSURE: 72 MMHG

## 2018-05-30 PROCEDURE — 99214 OFFICE O/P EST MOD 30 MIN: CPT | Mod: 25

## 2018-05-30 PROCEDURE — 36415 COLL VENOUS BLD VENIPUNCTURE: CPT

## 2018-05-30 NOTE — REVIEW OF SYSTEMS
[Recent Change In Weight] : ~T recent weight change [Negative] : Heme/Lymph [Abdominal Pain] : no abdominal pain [Nausea] : no nausea [Constipation] : no constipation [Diarrhea] : no diarrhea [Vomiting] : no vomiting [Heartburn] : no heartburn [Melena] : no melena [Skin Rash] : no skin rash

## 2018-05-30 NOTE — PHYSICAL EXAM
[No Acute Distress] : no acute distress [Well Nourished] : well nourished [Well Developed] : well developed [Well-Appearing] : well-appearing [Normal Sclera/Conjunctiva] : normal sclera/conjunctiva [PERRL] : pupils equal round and reactive to light [EOMI] : extraocular movements intact [Normal Outer Ear/Nose] : the outer ears and nose were normal in appearance [Normal Oropharynx] : the oropharynx was normal [No JVD] : no jugular venous distention [Supple] : supple [No Lymphadenopathy] : no lymphadenopathy [Thyroid Normal, No Nodules] : the thyroid was normal and there were no nodules present [No Respiratory Distress] : no respiratory distress  [Clear to Auscultation] : lungs were clear to auscultation bilaterally [No Accessory Muscle Use] : no accessory muscle use [Normal Rate] : normal rate  [Regular Rhythm] : with a regular rhythm [Normal S1, S2] : normal S1 and S2 [No Carotid Bruits] : no carotid bruits [No Abdominal Bruit] : a ~M bruit was not heard ~T in the abdomen [Pedal Pulses Present] : the pedal pulses are present [No Edema] : there was no peripheral edema [No Palpable Aorta] : no palpable aorta [Soft] : abdomen soft [Non Tender] : non-tender [Non-distended] : non-distended [No Masses] : no abdominal mass palpated [No HSM] : no HSM [Normal Bowel Sounds] : normal bowel sounds [Normal Posterior Cervical Nodes] : no posterior cervical lymphadenopathy [Normal Anterior Cervical Nodes] : no anterior cervical lymphadenopathy [No CVA Tenderness] : no CVA  tenderness [No Spinal Tenderness] : no spinal tenderness [No Joint Swelling] : no joint swelling [Grossly Normal Strength/Tone] : grossly normal strength/tone [No Rash] : no rash [Normal Gait] : normal gait [Coordination Grossly Intact] : coordination grossly intact [No Focal Deficits] : no focal deficits [Speech Grossly Normal] : speech grossly normal [Memory Grossly Normal] : memory grossly normal [Normal Affect] : the affect was normal [Alert and Oriented x3] : oriented to person, place, and time [Normal Mood] : the mood was normal [Normal Insight/Judgement] : insight and judgment were intact [de-identified] : 1-2/6 syst murmur at the rusb

## 2018-05-30 NOTE — HISTORY OF PRESENT ILLNESS
[FreeTextEntry1] : htn, lumbar radic, obesity, hypokalemia, el cr [de-identified] : Pt is a 28 y/o male with a hx of asthma - reports that he grew out of it, ptsd in the past who s/p admission to [facility] 2/16-2/18 with sciatica and hypertensive emergency.\par BP improved on rx - has been taking clonidine, amlodipine, hctz\par Has reduced sodium in the diet. not a smoker, occ caffeine, occ etoh. Has been exercising.  Has been trying to hydrate.\par + some wt loss - mostly stable.\par BP at home has been in the 120s-130s.  goes down with serial checking\par \par Reprots that the back has been doing well.  has been doing yoga and stretches.  has been working out.  .\par \par continues not to have any asthma sx.\par \par No noted CV sx. \par No GI sx.  no more constipation.  \par No urinary sx.\par No other noted musculoskeletal sx.\par No other noted neuro sx. \par \par had flu vaccine

## 2018-05-31 LAB
ANION GAP SERPL CALC-SCNC: 16 MMOL/L
BUN SERPL-MCNC: 9 MG/DL
CALCIUM SERPL-MCNC: 10 MG/DL
CHLORIDE SERPL-SCNC: 100 MMOL/L
CO2 SERPL-SCNC: 27 MMOL/L
CREAT SERPL-MCNC: 1.25 MG/DL
GLUCOSE SERPL-MCNC: 125 MG/DL
HBA1C MFR BLD HPLC: 5.6 %
MAGNESIUM SERPL-MCNC: 2.1 MG/DL
POTASSIUM SERPL-SCNC: 4.4 MMOL/L
SODIUM SERPL-SCNC: 143 MMOL/L

## 2018-06-21 ENCOUNTER — RX RENEWAL (OUTPATIENT)
Age: 28
End: 2018-06-21

## 2018-07-22 ENCOUNTER — RX RENEWAL (OUTPATIENT)
Age: 28
End: 2018-07-22

## 2018-09-25 ENCOUNTER — RX RENEWAL (OUTPATIENT)
Age: 28
End: 2018-09-25

## 2018-09-30 ENCOUNTER — APPOINTMENT (OUTPATIENT)
Dept: INTERNAL MEDICINE | Facility: CLINIC | Age: 28
End: 2018-09-30

## 2018-10-22 ENCOUNTER — RX RENEWAL (OUTPATIENT)
Age: 28
End: 2018-10-22

## 2018-10-24 ENCOUNTER — APPOINTMENT (OUTPATIENT)
Dept: INTERNAL MEDICINE | Facility: CLINIC | Age: 28
End: 2018-10-24
Payer: SELF-PAY

## 2018-10-24 ENCOUNTER — NON-APPOINTMENT (OUTPATIENT)
Age: 28
End: 2018-10-24

## 2018-10-24 VITALS — DIASTOLIC BLOOD PRESSURE: 70 MMHG | SYSTOLIC BLOOD PRESSURE: 110 MMHG

## 2018-10-24 VITALS
HEART RATE: 92 BPM | SYSTOLIC BLOOD PRESSURE: 90 MMHG | BODY MASS INDEX: 30.35 KG/M2 | WEIGHT: 230 LBS | RESPIRATION RATE: 12 BRPM | DIASTOLIC BLOOD PRESSURE: 64 MMHG

## 2018-10-24 VITALS — DIASTOLIC BLOOD PRESSURE: 60 MMHG | SYSTOLIC BLOOD PRESSURE: 96 MMHG

## 2018-10-24 DIAGNOSIS — Z78.9 OTHER SPECIFIED HEALTH STATUS: ICD-10-CM

## 2018-10-24 PROCEDURE — 93000 ELECTROCARDIOGRAM COMPLETE: CPT

## 2018-10-24 PROCEDURE — 99395 PREV VISIT EST AGE 18-39: CPT

## 2018-10-24 NOTE — REVIEW OF SYSTEMS
[Recent Change In Weight] : ~T recent weight change [Negative] : Heme/Lymph [Abdominal Pain] : no abdominal pain [Nausea] : no nausea [Constipation] : no constipation [Diarrhea] : no diarrhea [Vomiting] : no vomiting [Heartburn] : no heartburn [Melena] : no melena [Skin Rash] : no skin rash [FreeTextEntry2] : some wt loss

## 2018-10-24 NOTE — HISTORY OF PRESENT ILLNESS
[FreeTextEntry1] : htn, lumbar radic, obesity, hypokalemia, el cr [de-identified] : Pt is a 29 y/o male with a hx of asthma - reports that he grew out of it, ptsd in the past who s/p admission to [facility] 2/16-2/18 with sciatica and hypertensive emergency.\par \par BP improved on rx - has been taking clonidine, amlodipine, hctz\par Has reduced sodium in the diet. not a smoker, occ caffeine, occ etoh. Has been exercising.  Has been trying to hydrate.\par + some wt loss - mostly stable.\par BP at home has been in the 120s-130s.  goes down with serial checking\par \par Reprots that the back has been doing well.  has been doing yoga and stretches.  has been working out.  .\par \par continues not to have any asthma sx.\par \par No noted CV sx. \par No GI sx.  no more constipation.  \par No urinary sx.\par No other noted musculoskeletal sx.\par No other noted neuro sx. \par \par had flu vaccine

## 2018-10-24 NOTE — ASSESSMENT
[FreeTextEntry1] : Discussed with the patient health care maintenance.  \par Discussed age and condition appropriate vaccinations.  \par Discussed age appropriate cancer screening testing as indicated, including colon cancer screening/colonoscopy, prostate cancer screening/PSA testing, testicular cancer screening/self exam and skin cancer screening.  \par Discussed protection from the sun.  \par Discussed healthy diet, exercise and weight control for health.\par Discussed healthy habits including abstaining from smoking and drugs and abstention/moderation with alcohol.\par Discussed routine regular ophthalmology and dental follow up.\par Routine labs discussed with the patient.  He defers to get when he has insurance\par \par Flu vaccine deferred - to get in pharmacy

## 2018-10-24 NOTE — HISTORY OF PRESENT ILLNESS
[FreeTextEntry1] : htn, lumbar radic, obesity, hypokalemia, el cr [de-identified] : Pt is a 27 y/o male with a hx of asthma - reports that he grew out of it, ptsd in the past who s/p admission to [facility] 2/16-2/18 with sciatica and hypertensive emergency.\par \par BP improved on rx - has been taking clonidine, amlodipine, hctz\par Has reduced sodium in the diet. not a smoker, occ caffeine, occ etoh. Has been exercising.  Has been trying to hydrate.\par + some wt loss - mostly stable.\par BP at home has been in the 120s-130s.  goes down with serial checking\par \par Reprots that the back has been doing well.  has been doing yoga and stretches.  has been working out.  .\par \par continues not to have any asthma sx.\par \par No noted CV sx. \par No GI sx.  no more constipation.  \par No urinary sx.\par No other noted musculoskeletal sx.\par No other noted neuro sx. \par \par had flu vaccine

## 2018-10-24 NOTE — HEALTH RISK ASSESSMENT
[No falls in past year] : Patient reported no falls in the past year [None] : None [Alone] : lives alone [Employed] : employed [Single] : single [Feels Safe at Home] : Feels safe at home [Fully functional (bathing, dressing, toileting, transferring, walking, feeding)] : Fully functional (bathing, dressing, toileting, transferring, walking, feeding) [Fully functional (using the telephone, shopping, preparing meals, housekeeping, doing laundry, using] : Fully functional and needs no help or supervision to perform IADLs (using the telephone, shopping, preparing meals, housekeeping, doing laundry, using transportation, managing medications and managing finances) [Smoke Detector] : smoke detector [Carbon Monoxide Detector] : carbon monoxide detector [Seat Belt] :  uses seat belt [Sunscreen] : uses sunscreen [Change in mental status noted] : No change in mental status noted [High Risk Behavior] : no high risk behavior [Reports changes in hearing] : Reports no changes in hearing [Reports changes in vision] : Reports no changes in vision [Reports changes in dental health] : Reports no changes in dental health [de-identified] : roomates [] : No [0] : 2) Feeling down, depressed, or hopeless: Not at all (0) [[id number]] : 0

## 2018-10-24 NOTE — PHYSICAL EXAM
[No Acute Distress] : no acute distress [Well Nourished] : well nourished [Well Developed] : well developed [Well-Appearing] : well-appearing [Normal Sclera/Conjunctiva] : normal sclera/conjunctiva [PERRL] : pupils equal round and reactive to light [EOMI] : extraocular movements intact [Normal Outer Ear/Nose] : the outer ears and nose were normal in appearance [Normal Oropharynx] : the oropharynx was normal [Normal TMs] : both tympanic membranes were normal [Normal Nasal Mucosa] : the nasal mucosa was normal [No JVD] : no jugular venous distention [Supple] : supple [No Lymphadenopathy] : no lymphadenopathy [Thyroid Normal, No Nodules] : the thyroid was normal and there were no nodules present [No Respiratory Distress] : no respiratory distress  [Clear to Auscultation] : lungs were clear to auscultation bilaterally [No Accessory Muscle Use] : no accessory muscle use [Normal Rate] : normal rate  [Regular Rhythm] : with a regular rhythm [Normal S1, S2] : normal S1 and S2 [No Murmur] : no murmur heard [No Carotid Bruits] : no carotid bruits [No Abdominal Bruit] : a ~M bruit was not heard ~T in the abdomen [Pedal Pulses Present] : the pedal pulses are present [No Edema] : there was no peripheral edema [No Palpable Aorta] : no palpable aorta [Soft] : abdomen soft [Non Tender] : non-tender [Non-distended] : non-distended [No Masses] : no abdominal mass palpated [No HSM] : no HSM [Normal Bowel Sounds] : normal bowel sounds [No Hernias] : no hernias [Penis Abnormality] : normal uncircumcised penis [Testes Tenderness] : no tenderness of the testes [Testes Mass (___cm)] : there were no testicular masses [Normal Posterior Cervical Nodes] : no posterior cervical lymphadenopathy [Normal Anterior Cervical Nodes] : no anterior cervical lymphadenopathy [Normal Inguinal Nodes] : no inguinal lymphadenopathy [No CVA Tenderness] : no CVA  tenderness [No Spinal Tenderness] : no spinal tenderness [No Joint Swelling] : no joint swelling [Grossly Normal Strength/Tone] : grossly normal strength/tone [No Rash] : no rash [Normal Gait] : normal gait [Coordination Grossly Intact] : coordination grossly intact [No Focal Deficits] : no focal deficits [Speech Grossly Normal] : speech grossly normal [Memory Grossly Normal] : memory grossly normal [Normal Affect] : the affect was normal [Alert and Oriented x3] : oriented to person, place, and time [Normal Mood] : the mood was normal [Normal Insight/Judgement] : insight and judgment were intact

## 2018-10-24 NOTE — HEALTH RISK ASSESSMENT
[No falls in past year] : Patient reported no falls in the past year [None] : None [Alone] : lives alone [Employed] : employed [Single] : single [Feels Safe at Home] : Feels safe at home [Fully functional (bathing, dressing, toileting, transferring, walking, feeding)] : Fully functional (bathing, dressing, toileting, transferring, walking, feeding) [Fully functional (using the telephone, shopping, preparing meals, housekeeping, doing laundry, using] : Fully functional and needs no help or supervision to perform IADLs (using the telephone, shopping, preparing meals, housekeeping, doing laundry, using transportation, managing medications and managing finances) [Smoke Detector] : smoke detector [Carbon Monoxide Detector] : carbon monoxide detector [Seat Belt] :  uses seat belt [Sunscreen] : uses sunscreen [Change in mental status noted] : No change in mental status noted [High Risk Behavior] : no high risk behavior [Reports changes in hearing] : Reports no changes in hearing [Reports changes in vision] : Reports no changes in vision [Reports changes in dental health] : Reports no changes in dental health [de-identified] : roomates [] : No [0] : 2) Feeling down, depressed, or hopeless: Not at all (0) [[id number]] : 0

## 2018-11-23 ENCOUNTER — RX RENEWAL (OUTPATIENT)
Age: 28
End: 2018-11-23

## 2018-12-23 ENCOUNTER — RX RENEWAL (OUTPATIENT)
Age: 28
End: 2018-12-23

## 2019-01-16 ENCOUNTER — RX RENEWAL (OUTPATIENT)
Age: 29
End: 2019-01-16

## 2019-02-15 NOTE — H&P ADULT - NSHPSOCIALHISTORY_GEN_ALL_CORE
Pt called and stated that the medications that were sent today to [facility] pharmacy needs to go through [facility] mail order  no tobacco  social alchohol

## 2019-02-19 ENCOUNTER — APPOINTMENT (OUTPATIENT)
Dept: INTERNAL MEDICINE | Facility: CLINIC | Age: 29
End: 2019-02-19

## 2019-02-25 ENCOUNTER — RX RENEWAL (OUTPATIENT)
Age: 29
End: 2019-02-25

## 2019-03-25 ENCOUNTER — RX RENEWAL (OUTPATIENT)
Age: 29
End: 2019-03-25

## 2019-03-27 ENCOUNTER — APPOINTMENT (OUTPATIENT)
Dept: INTERNAL MEDICINE | Facility: CLINIC | Age: 29
End: 2019-03-27
Payer: SELF-PAY

## 2019-03-27 VITALS
BODY MASS INDEX: 32.07 KG/M2 | HEIGHT: 73 IN | DIASTOLIC BLOOD PRESSURE: 78 MMHG | SYSTOLIC BLOOD PRESSURE: 140 MMHG | WEIGHT: 242 LBS | HEART RATE: 92 BPM

## 2019-03-27 VITALS — DIASTOLIC BLOOD PRESSURE: 70 MMHG | SYSTOLIC BLOOD PRESSURE: 140 MMHG

## 2019-03-27 VITALS — RESPIRATION RATE: 12 BRPM

## 2019-03-27 PROCEDURE — 99214 OFFICE O/P EST MOD 30 MIN: CPT

## 2019-03-27 NOTE — REVIEW OF SYSTEMS
[Negative] : Heme/Lymph [Recent Change In Weight] : ~T no recent weight change [Abdominal Pain] : no abdominal pain [Nausea] : no nausea [Constipation] : no constipation [Diarrhea] : no diarrhea [Vomiting] : no vomiting [Heartburn] : no heartburn [Melena] : no melena [Skin Rash] : no skin rash [FreeTextEntry2] : some wt loss

## 2019-03-27 NOTE — HISTORY OF PRESENT ILLNESS
[FreeTextEntry1] : htn, lumbar radic, obesity, hypokalemia, el cr [de-identified] : Pt is a 29 y/o male with a hx of asthma - reports that he grew out of it, ptsd in the past who s/p admission to [facility] 2/16-2/18 with sciatica and hypertensive emergency.  with el cr and low pot on labs.  better with repeat testing.\par \par BP improved on rx - has been taking clonidine, amlodipine, hctz\par Has reduced sodium in the diet. not a smoker, occ caffeine, occ etoh. Has been exercising. Has been trying to hydrate.\par wt - mostly stable.\par BP at home has been okay - ` 120s\par \par Reprots that the back has been doing well. has been doing yoga and stretches. has been working out..\par \par continues not to have any asthma sx.\par \par No noted CV sx. \par No GI sx. \par No urinary sx.\par No other noted musculoskeletal sx.\par No noted neuro sx. \par \par dental, ophtho - due\par \par Flu vaccine - had 1/19

## 2019-04-24 ENCOUNTER — RX RENEWAL (OUTPATIENT)
Age: 29
End: 2019-04-24

## 2019-04-28 NOTE — PATIENT PROFILE ADULT. - PAIN CHRONIC, PROFILE
.        HOSPITALIST PROGRESS NOTE:      Patient: Ml Howard Date: 4/28/2019   female, 55 year old  Admit Date: 4/26/2019   Attending: Becky Corley MD      Subjective:  Ml Howard is a 55 year old year old female who is being seen in follow up for Alcoholic liver disease (CMS/HCC)   Still feels weak . No fever or abd pain. No cp or sob. No nausea or vomting        ALLERGIES:   Allergies as of 04/26/2019 - Reviewed 04/26/2019   Allergen Reaction Noted   • Penicillin g ANAPHYLAXIS 11/04/2013   • Ceclor [cefaclor] RASH 11/04/2013   • Kiwi   (food or med) Other (See Comments) 04/26/2019   • Seasonal Other (See Comments) 04/26/2019   • Sulfa antibiotics RASH 11/04/2013       Past Medical History : Reviewed   Past Social and Family History: Reviewed  Medications : Reviewed      Objective/Physical Exam     Vital 24 Hour Range Last Value  Temperature Temp  Min: 98.3 °F (36.8 °C)  Max: 98.8 °F (37.1 °C) 98.4 °F (36.9 °C) (04/28/19 0720)  Pulse Pulse  Min: 86  Max: 97 92 (04/28/19 0720)  Respiratory Resp  Min: 16  Max: 22 22 (04/28/19 0720)  Non-Invasive  Blood Pressure BP  Min: 106/60  Max: 141/66 113/62 (04/28/19 0720)  Pulse Oximetry SpO2  Min: 94 %  Max: 100 % 94 % (04/28/19 0720)    General appearance: jaundiced, in no obvious distress  Head: Normocephalic, without obvious abnormality, atraumatic  Neck: no adenopathy, no carotid bruit, no JVD, supple, symmetrical, trachea midline and thyroid not enlarged, symmetric, no tenderness/mass/nodules  Lungs: vesicular bs with decreased air entry at the bases  Heart: regular rate and rhythm, S1, S2 normal, no murmur, click, rub or gallop  Abdomen:distended. No areas of tenderness. bs positive  Extremities: bilateral pedal oedema +2  Skin: Skin color, texture, turgor normal. No rashes or lesions or eczema in bilateral lower ext.   Neurologic: alert and oriented x3. no tremors    Laboratory Results:      Labs personally reviewed     Pertinent:     Recent Labs    Lab 04/28/19  0540 04/27/19  0430 04/26/19  1050   WBC 12.6* 11.4* 12.5*   RBC 2.57* 2.65* 2.71*   HGB 9.3* 9.6* 9.7*   HCT 26.4* 27.1* 27.8*    179 160   SEG 68 68 73       Recent Labs   Lab 04/28/19  0540 04/27/19  0430 04/26/19  1050   SODIUM 135 136 133*   POTASSIUM 4.0 3.9 3.7   CHLORIDE 103 103 101   CO2 24 26 23   BUN 12 9 9   CREATININE 0.78 0.75 0.67   GFRNA 84 88 >90   GLUCOSE 83 99 116*   CALCIUM 8.5 8.5 8.6   ALBUMIN 1.9* 2.0* 2.1*   * 116* 131*   GPT 20 21 23   BILIRUBIN 8.6* 8.4* 8.0*   ALKPT 200* 217* 231*   INR 1.2 1.2 1.2       No results found for this or any previous visit.          Diagnostics: Reviewed     No results found for this or any previous visit.     Medications/Infusions:    Reviewed       Assessment & Plan:     Active Hospital Problems    Diagnosis Date Noted   • Alcoholic liver disease (CMS/HCC) 04/26/2019     Priority: Low   • Ascites due to alcoholic cirrhosis (CMS/HCC) 04/26/2019     Priority: Low   • Hypomagnesemia 04/26/2019     Priority: Low   • Hypophosphatemia 04/26/2019     Priority: Low   • Alcohol withdrawal syndrome without complication (CMS/HCC) 04/26/2019     Priority: Low   • Hyperbilirubinemia 04/26/2019     Priority: Low   • Hypothyroidism 01/25/2019     Priority: Low   • Mild intermittent asthma 11/04/2013     Priority: Low     PFT on 1/13/2015 shows FEV1 80, FVC 83, FEV1/FVC ratio 75. Normal TLC-109, elevated RV-152. DLCO low-70.         · Alcoholic liver Disease with ascites - US consistent with cirrhosis.She still has significant ascites. Will order US guided paracentesis.  · Elevated Ammonia Level -ammonia down from 63 to 48. Continue lactulose  · Hypophosphatemia - resolved  · Alcohol Withdrawal- ativan prn  · Weakness- PT/OT.   · Alcohol abuse- states that she committed to quitting alcolol      DVT Propylaxis - Lovenox    Code status: No Resuscitation with Maximal Medical Support      Central Line- no  [place]ca Catheter- no        -------------------------------------------------------------------------------------------------------------------      Hospital Day #: Hospital Day: 3    Tentative discharge Disposition: Home     GOALS OF CARE :     Goals of care were discussed with the patient and family which included but not limited to anticipated treatment course during the current hospitalization, recovery from current event, discharge planning and transitions of care responsibilities and expected outcomes. Code Status was addressed on admission as well.  End of life care discussion done/ not done        Signed:  Bekcy Corley MD  4/28/2019  1:24 PM   no

## 2019-05-24 ENCOUNTER — RX RENEWAL (OUTPATIENT)
Age: 29
End: 2019-05-24

## 2019-06-04 ENCOUNTER — RX RENEWAL (OUTPATIENT)
Age: 29
End: 2019-06-04

## 2019-06-24 ENCOUNTER — RX RENEWAL (OUTPATIENT)
Age: 29
End: 2019-06-24

## 2019-07-25 ENCOUNTER — RX RENEWAL (OUTPATIENT)
Age: 29
End: 2019-07-25

## 2019-08-16 ENCOUNTER — APPOINTMENT (OUTPATIENT)
Dept: INTERNAL MEDICINE | Facility: CLINIC | Age: 29
End: 2019-08-16
Payer: MEDICAID

## 2019-08-16 ENCOUNTER — NON-APPOINTMENT (OUTPATIENT)
Age: 29
End: 2019-08-16

## 2019-08-16 VITALS — DIASTOLIC BLOOD PRESSURE: 80 MMHG | SYSTOLIC BLOOD PRESSURE: 144 MMHG

## 2019-08-16 VITALS
BODY MASS INDEX: 31.81 KG/M2 | SYSTOLIC BLOOD PRESSURE: 140 MMHG | RESPIRATION RATE: 16 BRPM | HEIGHT: 73 IN | WEIGHT: 240 LBS | HEART RATE: 97 BPM | DIASTOLIC BLOOD PRESSURE: 78 MMHG

## 2019-08-16 PROCEDURE — 99395 PREV VISIT EST AGE 18-39: CPT | Mod: 25

## 2019-08-16 PROCEDURE — 36415 COLL VENOUS BLD VENIPUNCTURE: CPT

## 2019-08-16 PROCEDURE — 93000 ELECTROCARDIOGRAM COMPLETE: CPT

## 2019-08-16 PROCEDURE — 94010 BREATHING CAPACITY TEST: CPT

## 2019-08-16 NOTE — PHYSICAL EXAM
[No Carotid Bruits] : no carotid bruits [No Abdominal Bruit] : a ~M bruit was not heard ~T in the abdomen [Pedal Pulses Present] : the pedal pulses are present [No Edema] : there was no peripheral edema [No Palpable Aorta] : no palpable aorta [Normal Posterior Cervical Nodes] : no posterior cervical lymphadenopathy [Normal Anterior Cervical Nodes] : no anterior cervical lymphadenopathy [Normal] : normal gait, coordination grossly intact, no focal deficits and deep tendon reflexes were 2+ and symmetric [Speech Grossly Normal] : speech grossly normal [Normal Affect] : the affect was normal [Memory Grossly Normal] : memory grossly normal [Normal Mood] : the mood was normal [Alert and Oriented x3] : oriented to person, place, and time [Normal Insight/Judgement] : insight and judgment were intact [No Hernias] : no hernias [Penis Abnormality] : normal uncircumcised penis [Testes Tenderness] : no tenderness of the testes [Normal Inguinal Nodes] : no inguinal lymphadenopathy [Testes Mass (___cm)] : there were no testicular masses [de-identified] : impacted cerumen at the lt eac

## 2019-08-16 NOTE — PHYSICAL EXAM
[No Carotid Bruits] : no carotid bruits [No Abdominal Bruit] : a ~M bruit was not heard ~T in the abdomen [Pedal Pulses Present] : the pedal pulses are present [No Edema] : there was no peripheral edema [No Palpable Aorta] : no palpable aorta [Normal Posterior Cervical Nodes] : no posterior cervical lymphadenopathy [Normal Anterior Cervical Nodes] : no anterior cervical lymphadenopathy [Speech Grossly Normal] : speech grossly normal [Normal] : normal gait, coordination grossly intact, no focal deficits and deep tendon reflexes were 2+ and symmetric [Memory Grossly Normal] : memory grossly normal [Normal Affect] : the affect was normal [Normal Mood] : the mood was normal [Alert and Oriented x3] : oriented to person, place, and time [Normal Insight/Judgement] : insight and judgment were intact [No Hernias] : no hernias [Penis Abnormality] : normal uncircumcised penis [Testes Tenderness] : no tenderness of the testes [Normal Inguinal Nodes] : no inguinal lymphadenopathy [Testes Mass (___cm)] : there were no testicular masses [de-identified] : impacted cerumen at the lt eac

## 2019-08-16 NOTE — COUNSELING
[Encouraged to increase physical activity] : Encouraged to increase physical activity [Decrease Portions] : decrease portions [None] : None [Good understanding] : Patient has a good understanding of lifestyle changes and steps needed to achieve self management goal

## 2019-08-16 NOTE — HEALTH RISK ASSESSMENT
[Yes] : Yes [Monthly or less (1 pt)] : Monthly or less (1 point) [1 or 2 (0 pts)] : 1 or 2 (0 points) [No falls in past year] : Patient reported no falls in the past year [Never (0 pts)] : Never (0 points) [0] : 2) Feeling down, depressed, or hopeless: Not at all (0) [None] : None [Employed] : employed [Alone] : lives alone [Single] : single [Feels Safe at Home] : Feels safe at home [Fully functional (bathing, dressing, toileting, transferring, walking, feeding)] : Fully functional (bathing, dressing, toileting, transferring, walking, feeding) [Fully functional (using the telephone, shopping, preparing meals, housekeeping, doing laundry, using] : Fully functional and needs no help or supervision to perform IADLs (using the telephone, shopping, preparing meals, housekeeping, doing laundry, using transportation, managing medications and managing finances) [Smoke Detector] : smoke detector [Seat Belt] :  uses seat belt [Carbon Monoxide Detector] : carbon monoxide detector [Sunscreen] : uses sunscreen [] : No [Audit-CScore] : 1 [[id number]] : 0 [Change in mental status noted] : No change in mental status noted [High Risk Behavior] : no high risk behavior [Reports changes in hearing] : Reports no changes in hearing [Reports changes in dental health] : Reports no changes in dental health [Reports changes in vision] : Reports no changes in vision [de-identified] : roomates

## 2019-08-16 NOTE — HISTORY OF PRESENT ILLNESS
[FreeTextEntry1] : htn, lumbar radic, obesity, hypokalemia, el cr [de-identified] : Pt is a 29 y/o male with a hx of asthma - reports that he grew out of it, ptsd in the past who s/p admission to [facility] 2/16-2/18 with sciatica and hypertensive emergency.  with el cr and low pot on labs.  better with repeat testing.\par \par BP improved on rx - has been taking clonidine, amlodipine, hctz\par Has reduced sodium in the diet. not a smoker, occ caffeine, occ etoh. Has been exercising. Has been trying to hydrate.\par wt - mostly stable.\par BP at home has been okay - ` 120s\par \par Reprots that the back has been doing well. has been doing yoga and stretches. has been working out..\par \par continues not to have any asthma sx.\par \par No noted CV sx. \par No GI sx. \par No urinary sx.\par No other noted musculoskeletal sx.\par No noted neuro sx. \par \par dental, ophtho - due\par \par Flu vaccine - had 1/19

## 2019-08-16 NOTE — REVIEW OF SYSTEMS
[Fatigue] : fatigue [Negative] : Heme/Lymph [Chills] : no chills [Fever] : no fever [Night Sweats] : no night sweats [Hot Flashes] : no hot flashes [Recent Change In Weight] : ~T no recent weight change [Abdominal Pain] : no abdominal pain [Nausea] : no nausea [Diarrhea] : no diarrhea [Constipation] : no constipation [Vomiting] : no vomiting [Heartburn] : no heartburn [Melena] : no melena [Skin Rash] : no skin rash

## 2019-08-16 NOTE — HISTORY OF PRESENT ILLNESS
[FreeTextEntry1] : htn, lumbar radic, obesity, hypokalemia, el cr [de-identified] : Pt is a 27 y/o male with a hx of asthma - reports that he grew out of it, ptsd in the past who s/p admission to [facility] 2/16-2/18 with sciatica and hypertensive emergency.  with el cr and low pot on labs.  better with repeat testing.\par \par BP improved on rx - has been taking clonidine, amlodipine, hctz\par Has reduced sodium in the diet. not a smoker, occ caffeine, occ etoh. Has been exercising. Has been trying to hydrate.\par wt - mostly stable.\par BP at home has been okay - ` 120s\par \par Reprots that the back has been doing well. has been doing yoga and stretches. has been working out..\par \par continues not to have any asthma sx.\par \par No noted CV sx. \par No GI sx. \par No urinary sx.\par No other noted musculoskeletal sx.\par No noted neuro sx. \par \par dental, ophtho - due\par \par Flu vaccine - had 1/19

## 2019-08-16 NOTE — REVIEW OF SYSTEMS
[Fatigue] : fatigue [Negative] : Heme/Lymph [Chills] : no chills [Fever] : no fever [Hot Flashes] : no hot flashes [Night Sweats] : no night sweats [Recent Change In Weight] : ~T no recent weight change [Nausea] : no nausea [Abdominal Pain] : no abdominal pain [Constipation] : no constipation [Diarrhea] : no diarrhea [Heartburn] : no heartburn [Vomiting] : no vomiting [Melena] : no melena [Skin Rash] : no skin rash

## 2019-08-16 NOTE — HEALTH RISK ASSESSMENT
[Yes] : Yes [1 or 2 (0 pts)] : 1 or 2 (0 points) [Monthly or less (1 pt)] : Monthly or less (1 point) [No falls in past year] : Patient reported no falls in the past year [Never (0 pts)] : Never (0 points) [0] : 1) Little interest or pleasure doing things: Not at all (0) [None] : None [Alone] : lives alone [Employed] : employed [Single] : single [Fully functional (bathing, dressing, toileting, transferring, walking, feeding)] : Fully functional (bathing, dressing, toileting, transferring, walking, feeding) [Feels Safe at Home] : Feels safe at home [Fully functional (using the telephone, shopping, preparing meals, housekeeping, doing laundry, using] : Fully functional and needs no help or supervision to perform IADLs (using the telephone, shopping, preparing meals, housekeeping, doing laundry, using transportation, managing medications and managing finances) [Smoke Detector] : smoke detector [Carbon Monoxide Detector] : carbon monoxide detector [Seat Belt] :  uses seat belt [Sunscreen] : uses sunscreen [] : No [Audit-CScore] : 1 [GZH3Cyjfp] : 0 [Change in mental status noted] : No change in mental status noted [High Risk Behavior] : no high risk behavior [Reports changes in hearing] : Reports no changes in hearing [Reports changes in dental health] : Reports no changes in dental health [Reports changes in vision] : Reports no changes in vision [de-identified] : roomates

## 2019-08-21 LAB
25(OH)D3 SERPL-MCNC: 23.7 NG/ML
ALBUMIN SERPL ELPH-MCNC: 4.8 G/DL
ALP BLD-CCNC: 60 U/L
ALT SERPL-CCNC: 31 U/L
ANION GAP SERPL CALC-SCNC: 15 MMOL/L
AST SERPL-CCNC: 57 U/L
BASOPHILS # BLD AUTO: 0.04 K/UL
BASOPHILS NFR BLD AUTO: 0.9 %
BILIRUB SERPL-MCNC: 1 MG/DL
BUN SERPL-MCNC: 12 MG/DL
C TRACH RRNA SPEC QL NAA+PROBE: NOT DETECTED
CALCIUM SERPL-MCNC: 10.5 MG/DL
CHLORIDE SERPL-SCNC: 96 MMOL/L
CHOLEST SERPL-MCNC: 165 MG/DL
CHOLEST/HDLC SERPL: 4.3 RATIO
CO2 SERPL-SCNC: 29 MMOL/L
CREAT SERPL-MCNC: 1.15 MG/DL
EOSINOPHIL # BLD AUTO: 0.26 K/UL
EOSINOPHIL NFR BLD AUTO: 5.7 %
ESTIMATED AVERAGE GLUCOSE: 123 MG/DL
FOLATE SERPL-MCNC: >20 NG/ML
GLUCOSE SERPL-MCNC: 114 MG/DL
HBA1C MFR BLD HPLC: 5.9 %
HCT VFR BLD CALC: 45.4 %
HCV AB SER QL: NONREACTIVE
HCV S/CO RATIO: 0.06 S/CO
HDLC SERPL-MCNC: 38 MG/DL
HGB BLD-MCNC: 13.9 G/DL
HIV1+2 AB SPEC QL IA.RAPID: NONREACTIVE
HSV 1+2 IGG SER IA-IMP: POSITIVE
HSV 1+2 IGG SER IA-IMP: POSITIVE
HSV1 IGG SER QL: 49.1 INDEX
HSV2 IGG SER QL: 1.63 INDEX
IMM GRANULOCYTES NFR BLD AUTO: 0 %
IRON SATN MFR SERPL: 33 %
IRON SERPL-MCNC: 106 UG/DL
LDLC SERPL CALC-MCNC: 101 MG/DL
LYMPHOCYTES # BLD AUTO: 1.52 K/UL
LYMPHOCYTES NFR BLD AUTO: 33.3 %
MAN DIFF?: NORMAL
MCHC RBC-ENTMCNC: 25.7 PG
MCHC RBC-ENTMCNC: 30.6 GM/DL
MCV RBC AUTO: 83.9 FL
MONOCYTES # BLD AUTO: 0.45 K/UL
MONOCYTES NFR BLD AUTO: 9.8 %
N GONORRHOEA RRNA SPEC QL NAA+PROBE: NOT DETECTED
NEUTROPHILS # BLD AUTO: 2.3 K/UL
NEUTROPHILS NFR BLD AUTO: 50.3 %
PLATELET # BLD AUTO: 294 K/UL
POTASSIUM SERPL-SCNC: 3.2 MMOL/L
PROT SERPL-MCNC: 7.4 G/DL
RBC # BLD: 5.41 M/UL
RBC # FLD: 14.4 %
SODIUM SERPL-SCNC: 140 MMOL/L
SOURCE AMPLIFICATION: NORMAL
T PALLIDUM AB SER QL IA: NEGATIVE
TIBC SERPL-MCNC: 318 UG/DL
TRIGL SERPL-MCNC: 129 MG/DL
TSH SERPL-ACNC: 1.92 UIU/ML
UIBC SERPL-MCNC: 212 UG/DL
VIT B12 SERPL-MCNC: 367 PG/ML
WBC # FLD AUTO: 4.57 K/UL

## 2019-08-23 ENCOUNTER — RX RENEWAL (OUTPATIENT)
Age: 29
End: 2019-08-23

## 2019-09-02 PROBLEM — Z86.59 HISTORY OF POST TRAUMATIC STRESS DISORDER: Status: RESOLVED | Noted: 2018-02-20 | Resolved: 2019-09-02

## 2019-09-04 ENCOUNTER — TRANSCRIPTION ENCOUNTER (OUTPATIENT)
Age: 29
End: 2019-09-04

## 2019-11-15 ENCOUNTER — APPOINTMENT (OUTPATIENT)
Dept: INTERNAL MEDICINE | Facility: CLINIC | Age: 29
End: 2019-11-15

## 2019-12-02 ENCOUNTER — APPOINTMENT (OUTPATIENT)
Dept: INTERNAL MEDICINE | Facility: CLINIC | Age: 29
End: 2019-12-02
Payer: MEDICAID

## 2019-12-02 VITALS
BODY MASS INDEX: 30.09 KG/M2 | SYSTOLIC BLOOD PRESSURE: 142 MMHG | WEIGHT: 227 LBS | HEIGHT: 73 IN | HEART RATE: 90 BPM | RESPIRATION RATE: 16 BRPM | DIASTOLIC BLOOD PRESSURE: 80 MMHG

## 2019-12-02 VITALS — SYSTOLIC BLOOD PRESSURE: 132 MMHG | DIASTOLIC BLOOD PRESSURE: 78 MMHG

## 2019-12-02 DIAGNOSIS — Z87.09 PERSONAL HISTORY OF OTHER DISEASES OF THE RESPIRATORY SYSTEM: ICD-10-CM

## 2019-12-02 PROCEDURE — 90686 IIV4 VACC NO PRSV 0.5 ML IM: CPT

## 2019-12-02 PROCEDURE — 99214 OFFICE O/P EST MOD 30 MIN: CPT | Mod: 25

## 2019-12-02 PROCEDURE — G0008: CPT

## 2019-12-02 NOTE — HEALTH RISK ASSESSMENT
[Monthly or less (1 pt)] : Monthly or less (1 point) [Yes] : Yes [Never (0 pts)] : Never (0 points) [1 or 2 (0 pts)] : 1 or 2 (0 points) [No] : In the past 12 months have you used drugs other than those required for medical reasons? No [0] : 2) Feeling down, depressed, or hopeless: Not at all (0) [] : No [Audit-CScore] : 1 [[id number]] : 0

## 2019-12-02 NOTE — HISTORY OF PRESENT ILLNESS
[FreeTextEntry1] : htn, lumbar radic, obesity, hypokalemia, el cr [de-identified] : Pt is a 28 y/o male with a hx of asthma - reports that he grew out of it, ptsd in the past who s/p admission to [facility] 2/16-2/18/18 with sciatica and hypertensive emergency. with el cr and low pot on labs. better with repeat testing.\par Found to have el a1c and low vit D.  B12 low nl.  \par Has been taking vit D, B and MVI - fatigue has been improved\par BP improved on rx - has been taking clonidine, amlodipine, hctz\par Has reduced sodium in the diet. not a smoker, occ caffeine, rare etoh. \par Has been exercising. Has been trying to hydrate.\par wt - mostly stable.\par BP at home has been okay - 119 this morning\par Reprots that the back has been doing well. has been doing yoga and stretches. has been working out.  Dec b/c of travelling.\par continues not to have any asthma sx.\par No noted CV sx. \par No GI sx. \par No urinary sx.\par No other noted musculoskeletal sx.\par No noted neuro sx. \par with occ fatigue.\par \par denies std risk, requesting check. \par \par dental, ophtho - due\par \par for Flu vaccine\par tdap - ? 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

## 2019-12-02 NOTE — REVIEW OF SYSTEMS
[Negative] : Heme/Lymph [Fever] : no fever [Chills] : no chills [Fatigue] : no fatigue [Night Sweats] : no night sweats [Hot Flashes] : no hot flashes [Recent Change In Weight] : ~T no recent weight change [Abdominal Pain] : no abdominal pain [Nausea] : no nausea [Constipation] : no constipation [Diarrhea] : no diarrhea [Vomiting] : no vomiting [Heartburn] : no heartburn [Melena] : no melena [Skin Rash] : no skin rash

## 2019-12-02 NOTE — PHYSICAL EXAM
[No Carotid Bruits] : no carotid bruits [No Abdominal Bruit] : a ~M bruit was not heard ~T in the abdomen [Pedal Pulses Present] : the pedal pulses are present [No Palpable Aorta] : no palpable aorta [No Edema] : there was no peripheral edema [Normal Posterior Cervical Nodes] : no posterior cervical lymphadenopathy [Normal Anterior Cervical Nodes] : no anterior cervical lymphadenopathy [No CVA Tenderness] : no CVA  tenderness [No Spinal Tenderness] : no spinal tenderness [Normal] : normal gait, coordination grossly intact, no focal deficits and deep tendon reflexes were 2+ and symmetric [Speech Grossly Normal] : speech grossly normal [Memory Grossly Normal] : memory grossly normal [Normal Affect] : the affect was normal [Alert and Oriented x3] : oriented to person, place, and time [Normal Mood] : the mood was normal [Normal Insight/Judgement] : insight and judgment were intact [de-identified] : impacted cerumen at the lt eac

## 2019-12-02 NOTE — COUNSELING
[Encouraged to increase physical activity] : Encouraged to increase physical activity [Decrease Portions] : decrease portions [Good understanding] : Patient has a good understanding of lifestyle changes and steps needed to achieve self management goal [None] : None

## 2020-02-23 ENCOUNTER — RX RENEWAL (OUTPATIENT)
Age: 30
End: 2020-02-23

## 2020-05-20 ENCOUNTER — RX RENEWAL (OUTPATIENT)
Age: 30
End: 2020-05-20

## 2020-06-09 ENCOUNTER — APPOINTMENT (OUTPATIENT)
Dept: INTERNAL MEDICINE | Facility: CLINIC | Age: 30
End: 2020-06-09
Payer: MEDICAID

## 2020-06-09 VITALS
BODY MASS INDEX: 33.25 KG/M2 | WEIGHT: 252 LBS | SYSTOLIC BLOOD PRESSURE: 138 MMHG | HEART RATE: 114 BPM | DIASTOLIC BLOOD PRESSURE: 80 MMHG | RESPIRATION RATE: 16 BRPM

## 2020-06-09 VITALS
HEIGHT: 73 IN | SYSTOLIC BLOOD PRESSURE: 138 MMHG | WEIGHT: 252 LBS | HEART RATE: 114 BPM | DIASTOLIC BLOOD PRESSURE: 80 MMHG | RESPIRATION RATE: 16 BRPM | BODY MASS INDEX: 33.4 KG/M2

## 2020-06-09 PROCEDURE — 36415 COLL VENOUS BLD VENIPUNCTURE: CPT

## 2020-06-09 PROCEDURE — 99214 OFFICE O/P EST MOD 30 MIN: CPT | Mod: 25

## 2020-06-09 NOTE — HEALTH RISK ASSESSMENT
[Yes] : Yes [Monthly or less (1 pt)] : Monthly or less (1 point) [1 or 2 (0 pts)] : 1 or 2 (0 points) [Never (0 pts)] : Never (0 points) [No] : In the past 12 months have you used drugs other than those required for medical reasons? No [0] : 2) Feeling down, depressed, or hopeless: Not at all (0) [] : No [[id number]] : 0

## 2020-06-09 NOTE — HISTORY OF PRESENT ILLNESS
[FreeTextEntry1] : htn, lumbar radic, obesity, hypokalemia, el cr [de-identified] : Pt is a 28 y/o male with a hx of asthma - reports that he grew out of it, ptsd in the past who s/p admission to [facility] 2/16-2/18/18 with sciatica and hypertensive emergency. with el cr and low pot on labs. better with repeat testing.\par Found to have el a1c and low vit D.  B12 low nl.  \par Has been taking vit D, B and MVI - fatigue has been improved\par BP improved on rx - has been taking clonidine, amlodipine, hctz\par Has reduced sodium in the diet. not a smoker, occ caffeine, rare etoh. \par Has been exercising - better for the past few weeks. Has been trying to hydrate.\par wt - some gain due to covid 19 restrictions.\par BP at home has been okay - \par Reprots that the back has been doing well. has been doing yoga and stretches. has been working out.  Dec b/c of travelling.\par continues not to have any asthma sx.\par No noted CV sx. \par No GI sx. \par No urinary sx.\par No other noted musculoskeletal sx.\par No noted neuro sx. \par with occ fatigue.\par \par denies std risk, requesting check. \par \par dental, ophtho - due\par \par for Flu vaccine\par tdap - ? 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

## 2020-06-09 NOTE — REVIEW OF SYSTEMS
[Negative] : Heme/Lymph [Fever] : no fever [Chills] : no chills [Fatigue] : no fatigue [Hot Flashes] : no hot flashes [Night Sweats] : no night sweats [Recent Change In Weight] : ~T no recent weight change [Abdominal Pain] : no abdominal pain [Nausea] : no nausea [Constipation] : no constipation [Diarrhea] : no diarrhea [Vomiting] : no vomiting [Heartburn] : no heartburn [Melena] : no melena [Skin Rash] : no skin rash

## 2020-06-09 NOTE — PHYSICAL EXAM
[No Carotid Bruits] : no carotid bruits [No Abdominal Bruit] : a ~M bruit was not heard ~T in the abdomen [Pedal Pulses Present] : the pedal pulses are present [No Edema] : there was no peripheral edema [No Palpable Aorta] : no palpable aorta [Normal Posterior Cervical Nodes] : no posterior cervical lymphadenopathy [Normal Anterior Cervical Nodes] : no anterior cervical lymphadenopathy [No CVA Tenderness] : no CVA  tenderness [No Spinal Tenderness] : no spinal tenderness [Normal] : normal gait, coordination grossly intact, no focal deficits and deep tendon reflexes were 2+ and symmetric [Speech Grossly Normal] : speech grossly normal [Memory Grossly Normal] : memory grossly normal [Normal Affect] : the affect was normal [Alert and Oriented x3] : oriented to person, place, and time [Normal Mood] : the mood was normal [Normal Insight/Judgement] : insight and judgment were intact

## 2020-06-14 LAB
25(OH)D3 SERPL-MCNC: 32.2 NG/ML
ALBUMIN SERPL ELPH-MCNC: 4.9 G/DL
ALP BLD-CCNC: 62 U/L
ALT SERPL-CCNC: 27 U/L
ANION GAP SERPL CALC-SCNC: 16 MMOL/L
AST SERPL-CCNC: 39 U/L
BASOPHILS # BLD AUTO: 0.04 K/UL
BASOPHILS NFR BLD AUTO: 0.9 %
BILIRUB SERPL-MCNC: 0.7 MG/DL
BUN SERPL-MCNC: 11 MG/DL
CALCIUM SERPL-MCNC: 9.5 MG/DL
CHLORIDE SERPL-SCNC: 98 MMOL/L
CO2 SERPL-SCNC: 28 MMOL/L
CREAT SERPL-MCNC: 1.24 MG/DL
EOSINOPHIL # BLD AUTO: 0.27 K/UL
EOSINOPHIL NFR BLD AUTO: 6.3 %
ESTIMATED AVERAGE GLUCOSE: 120 MG/DL
FOLATE SERPL-MCNC: >20 NG/ML
GLUCOSE SERPL-MCNC: 112 MG/DL
HBA1C MFR BLD HPLC: 5.8 %
HCT VFR BLD CALC: 46 %
HCV AB SER QL: NONREACTIVE
HCV S/CO RATIO: 0.12 S/CO
HGB BLD-MCNC: 13.8 G/DL
HIV1+2 AB SPEC QL IA.RAPID: NONREACTIVE
HSV 1+2 IGG SER IA-IMP: POSITIVE
HSV 1+2 IGG SER IA-IMP: POSITIVE
HSV1 IGG SER QL: 47.5 INDEX
HSV2 IGG SER QL: 1.45 INDEX
IMM GRANULOCYTES NFR BLD AUTO: 0.2 %
LYMPHOCYTES # BLD AUTO: 1.61 K/UL
LYMPHOCYTES NFR BLD AUTO: 37.8 %
MAGNESIUM SERPL-MCNC: 2.2 MG/DL
MAN DIFF?: NORMAL
MCHC RBC-ENTMCNC: 25.7 PG
MCHC RBC-ENTMCNC: 30 GM/DL
MCV RBC AUTO: 85.5 FL
MONOCYTES # BLD AUTO: 0.41 K/UL
MONOCYTES NFR BLD AUTO: 9.6 %
NEUTROPHILS # BLD AUTO: 1.92 K/UL
NEUTROPHILS NFR BLD AUTO: 45.2 %
PLATELET # BLD AUTO: 297 K/UL
POTASSIUM SERPL-SCNC: 3.6 MMOL/L
PROT SERPL-MCNC: 7.5 G/DL
RBC # BLD: 5.38 M/UL
RBC # FLD: 14.9 %
SODIUM SERPL-SCNC: 142 MMOL/L
T PALLIDUM AB SER QL IA: NEGATIVE
VIT B12 SERPL-MCNC: 673 PG/ML
WBC # FLD AUTO: 4.26 K/UL

## 2020-08-26 ENCOUNTER — RX RENEWAL (OUTPATIENT)
Age: 30
End: 2020-08-26

## 2020-08-31 ENCOUNTER — RX RENEWAL (OUTPATIENT)
Age: 30
End: 2020-08-31

## 2020-12-01 ENCOUNTER — RX RENEWAL (OUTPATIENT)
Age: 30
End: 2020-12-01

## 2020-12-14 NOTE — ED ADULT NURSE REASSESSMENT NOTE - NS ED NURSE REASSESS COMMENT FT1
Quick eval was done  by DR. MILLER , pt. was determined to be stable to wait in the waiting area , pending placement. will continue to monitor
Statement Selected

## 2020-12-31 ENCOUNTER — RX RENEWAL (OUTPATIENT)
Age: 30
End: 2020-12-31

## 2021-01-25 ENCOUNTER — RX RENEWAL (OUTPATIENT)
Age: 31
End: 2021-01-25

## 2021-02-18 ENCOUNTER — APPOINTMENT (OUTPATIENT)
Dept: INTERNAL MEDICINE | Facility: CLINIC | Age: 31
End: 2021-02-18
Payer: MEDICAID

## 2021-02-18 VITALS
SYSTOLIC BLOOD PRESSURE: 122 MMHG | HEIGHT: 73 IN | RESPIRATION RATE: 16 BRPM | BODY MASS INDEX: 32.6 KG/M2 | OXYGEN SATURATION: 99 % | DIASTOLIC BLOOD PRESSURE: 80 MMHG | HEART RATE: 101 BPM | WEIGHT: 246 LBS

## 2021-02-18 PROCEDURE — 90471 IMMUNIZATION ADMIN: CPT

## 2021-02-18 PROCEDURE — 93000 ELECTROCARDIOGRAM COMPLETE: CPT

## 2021-02-18 PROCEDURE — 99072 ADDL SUPL MATRL&STAF TM PHE: CPT

## 2021-02-18 PROCEDURE — 90715 TDAP VACCINE 7 YRS/> IM: CPT

## 2021-02-18 PROCEDURE — 99395 PREV VISIT EST AGE 18-39: CPT | Mod: 25

## 2021-02-18 NOTE — HEALTH RISK ASSESSMENT
[Yes] : Yes [Monthly or less (1 pt)] : Monthly or less (1 point) [1 or 2 (0 pts)] : 1 or 2 (0 points) [Never (0 pts)] : Never (0 points) [No] : In the past 12 months have you used drugs other than those required for medical reasons? No [0] : 2) Feeling down, depressed, or hopeless: Not at all (0) [No falls in past year] : Patient reported no falls in the past year [None] : None [Employed] : employed [Single] : single [Sexually Active] : sexually active [Feels Safe at Home] : Feels safe at home [Fully functional (bathing, dressing, toileting, transferring, walking, feeding)] : Fully functional (bathing, dressing, toileting, transferring, walking, feeding) [Fully functional (using the telephone, shopping, preparing meals, housekeeping, doing laundry, using] : Fully functional and needs no help or supervision to perform IADLs (using the telephone, shopping, preparing meals, housekeeping, doing laundry, using transportation, managing medications and managing finances) [Smoke Detector] : smoke detector [Carbon Monoxide Detector] : carbon monoxide detector [Seat Belt] :  uses seat belt [Sunscreen] : uses sunscreen [] : No [Audit-CScore] : 1 [[id number]] : 0 [Change in mental status noted] : No change in mental status noted [High Risk Behavior] : no high risk behavior [Reports changes in hearing] : Reports no changes in hearing [Reports changes in vision] : Reports no changes in vision [Reports changes in dental health] : Reports no changes in dental health

## 2021-02-18 NOTE — HISTORY OF PRESENT ILLNESS
[FreeTextEntry1] : annual PE\par htn, lumbar radic, obesity, hypokalemia, el cr [de-identified] : Pt is a 29 y/o male with a hx of asthma - reports that he grew out of it, ptsd in the past who s/p admission to [facility] 2/16-2/18/18 with sciatica and hypertensive emergency. with el cr and low pot on labs. better with repeat testing.\par Found to have el a1c and low vit D. B12 low nl. \par Has been taking vit D, B and MVI - fatigue has been better\par BP improved on rx - has been taking clonidine, amlodipine, hctz\par Has reduced sodium in the diet. not a smoker, occ caffeine, rare etoh. \par Has been exercising - better. Has been trying to hydrate.\par wt - some loss.\par BP at home has been okay - \par Reprots that the back has been doing well. has been doing yoga and stretches. has been working out. Dec b/c of travelling.\par continues not to have any asthma sx.\par No noted CV sx. \par No GI sx. \par No urinary sx.\par No other noted musculoskeletal sx.\par No noted neuro sx. \par \par denies std risk, requesting check. \par \par dental - due, ophtho - has gone\par \par had Flu vaccine\par tdap - due - to get

## 2021-02-18 NOTE — REVIEW OF SYSTEMS
[Negative] : Psychiatric [Fever] : no fever [Chills] : no chills [Fatigue] : no fatigue [Hot Flashes] : no hot flashes [Night Sweats] : no night sweats [Recent Change In Weight] : ~T no recent weight change [Abdominal Pain] : no abdominal pain [Nausea] : no nausea [Constipation] : no constipation [Diarrhea] : no diarrhea [Vomiting] : no vomiting [Heartburn] : no heartburn [Melena] : no melena [Skin Rash] : no skin rash

## 2021-02-21 LAB
25(OH)D3 SERPL-MCNC: 38.6 NG/ML
ALBUMIN SERPL ELPH-MCNC: 4.5 G/DL
ALP BLD-CCNC: 74 U/L
ALT SERPL-CCNC: 27 U/L
ANION GAP SERPL CALC-SCNC: 14 MMOL/L
AST SERPL-CCNC: 27 U/L
BASOPHILS # BLD AUTO: 0.03 K/UL
BASOPHILS NFR BLD AUTO: 0.8 %
BILIRUB SERPL-MCNC: 0.4 MG/DL
BUN SERPL-MCNC: 13 MG/DL
C TRACH RRNA SPEC QL NAA+PROBE: NOT DETECTED
CALCIUM SERPL-MCNC: 9.8 MG/DL
CHLORIDE SERPL-SCNC: 102 MMOL/L
CHOLEST SERPL-MCNC: 160 MG/DL
CO2 SERPL-SCNC: 24 MMOL/L
CREAT SERPL-MCNC: 1.2 MG/DL
EOSINOPHIL # BLD AUTO: 0.17 K/UL
EOSINOPHIL NFR BLD AUTO: 4.4 %
ESTIMATED AVERAGE GLUCOSE: 123 MG/DL
FOLATE SERPL-MCNC: 18.4 NG/ML
GLUCOSE SERPL-MCNC: 117 MG/DL
HBA1C MFR BLD HPLC: 5.9 %
HCT VFR BLD CALC: 43.8 %
HCV AB SER QL: NONREACTIVE
HCV S/CO RATIO: 0.06 S/CO
HDLC SERPL-MCNC: 41 MG/DL
HGB BLD-MCNC: 13.6 G/DL
HIV1+2 AB SPEC QL IA.RAPID: NONREACTIVE
HSV 1+2 IGG SER IA-IMP: POSITIVE
HSV 1+2 IGG SER IA-IMP: POSITIVE
HSV1 IGG SER QL: 40.9 INDEX
HSV2 IGG SER QL: 1.33 INDEX
IMM GRANULOCYTES NFR BLD AUTO: 0 %
LDLC SERPL CALC-MCNC: 107 MG/DL
LYMPHOCYTES # BLD AUTO: 1.23 K/UL
LYMPHOCYTES NFR BLD AUTO: 32 %
MAGNESIUM SERPL-MCNC: 2 MG/DL
MAN DIFF?: NORMAL
MCHC RBC-ENTMCNC: 26 PG
MCHC RBC-ENTMCNC: 31.1 GM/DL
MCV RBC AUTO: 83.6 FL
MONOCYTES # BLD AUTO: 0.4 K/UL
MONOCYTES NFR BLD AUTO: 10.4 %
N GONORRHOEA RRNA SPEC QL NAA+PROBE: NOT DETECTED
NEUTROPHILS # BLD AUTO: 2.01 K/UL
NEUTROPHILS NFR BLD AUTO: 52.4 %
NONHDLC SERPL-MCNC: 119 MG/DL
PLATELET # BLD AUTO: 289 K/UL
POTASSIUM SERPL-SCNC: 3.7 MMOL/L
PROT SERPL-MCNC: 7.3 G/DL
RBC # BLD: 5.24 M/UL
RBC # FLD: 13.8 %
SODIUM SERPL-SCNC: 140 MMOL/L
SOURCE AMPLIFICATION: NORMAL
T PALLIDUM AB SER QL IA: NEGATIVE
TRIGL SERPL-MCNC: 61 MG/DL
TSH SERPL-ACNC: 1.04 UIU/ML
VIT B12 SERPL-MCNC: 1071 PG/ML
WBC # FLD AUTO: 3.84 K/UL

## 2021-06-30 ENCOUNTER — RX RENEWAL (OUTPATIENT)
Age: 31
End: 2021-06-30

## 2021-09-08 ENCOUNTER — RX RENEWAL (OUTPATIENT)
Age: 31
End: 2021-09-08

## 2021-09-30 ENCOUNTER — RX RENEWAL (OUTPATIENT)
Age: 31
End: 2021-09-30

## 2021-10-03 ENCOUNTER — RX RENEWAL (OUTPATIENT)
Age: 31
End: 2021-10-03

## 2021-10-12 ENCOUNTER — RX RENEWAL (OUTPATIENT)
Age: 31
End: 2021-10-12

## 2021-11-03 ENCOUNTER — APPOINTMENT (OUTPATIENT)
Dept: INTERNAL MEDICINE | Facility: CLINIC | Age: 31
End: 2021-11-03
Payer: MEDICAID

## 2021-11-03 VITALS
BODY MASS INDEX: 32.87 KG/M2 | WEIGHT: 248 LBS | SYSTOLIC BLOOD PRESSURE: 126 MMHG | DIASTOLIC BLOOD PRESSURE: 80 MMHG | HEIGHT: 73 IN | OXYGEN SATURATION: 99 % | RESPIRATION RATE: 16 BRPM | HEART RATE: 101 BPM

## 2021-11-03 DIAGNOSIS — Z23 ENCOUNTER FOR IMMUNIZATION: ICD-10-CM

## 2021-11-03 PROCEDURE — 99214 OFFICE O/P EST MOD 30 MIN: CPT | Mod: 25

## 2021-11-03 PROCEDURE — 90686 IIV4 VACC NO PRSV 0.5 ML IM: CPT

## 2021-11-03 PROCEDURE — G0008: CPT

## 2021-11-03 NOTE — HEALTH RISK ASSESSMENT
[Yes] : Yes [Monthly or less (1 pt)] : Monthly or less (1 point) [1 or 2 (0 pts)] : 1 or 2 (0 points) [Never (0 pts)] : Never (0 points) [No] : In the past 12 months have you used drugs other than those required for medical reasons? No [0] : 2) Feeling down, depressed, or hopeless: Not at all (0) [PHQ-2 Negative - No further assessment needed] : PHQ-2 Negative - No further assessment needed [] : No [Audit-CScore] : 1 [[id number]] : 0

## 2021-11-03 NOTE — HISTORY OF PRESENT ILLNESS
[FreeTextEntry1] : htn, lumbar radic, obesity, hypokalemia, el cr [de-identified] : Pt is a 32 y/o male with a hx of asthma - reports that he grew out of it, ptsd in the past who s/p admission to [facility] 2/16-2/18/18 with sciatica and hypertensive emergency. with el cr and low pot on labs. better with repeat testing.\par Found to have el a1c and low vit D. B12 low nl. \par Has been taking vit D, B and MVI - fatigue has been better\par BP improved on rx - has been taking clonidine, amlodipine, hctz - reported as controlled.  \par Has reduced sodium in the diet - healthy. not a smoker, occ caffeine, rare etoh. \par Has been exercising - occasional. Has been trying to hydrate.\par wt - some loss.\par Reprots that the back has been doing well. has been doing yoga and stretches. has been working out. Dec b/c of travelling.\par continues not to have any asthma sx.\par No noted CV sx. \par No GI sx. \par No urinary sx.\par No other noted musculoskeletal sx.\par No noted neuro sx. \par \par denies std risk, requesting check. \par \par dental - has apt, ophtho - has gone\par \par gets Flu vaccine\par tdap - had

## 2021-11-08 ENCOUNTER — RX RENEWAL (OUTPATIENT)
Age: 31
End: 2021-11-08

## 2021-11-09 LAB
25(OH)D3 SERPL-MCNC: 37.9 NG/ML
ALBUMIN SERPL ELPH-MCNC: 4.6 G/DL
ALP BLD-CCNC: 74 U/L
ALT SERPL-CCNC: 18 U/L
ANION GAP SERPL CALC-SCNC: 16 MMOL/L
AST SERPL-CCNC: 19 U/L
BASOPHILS # BLD AUTO: 0.05 K/UL
BASOPHILS NFR BLD AUTO: 1 %
BILIRUB SERPL-MCNC: 0.6 MG/DL
BUN SERPL-MCNC: 12 MG/DL
C TRACH RRNA SPEC QL NAA+PROBE: NOT DETECTED
CALCIUM SERPL-MCNC: 10.4 MG/DL
CHLORIDE SERPL-SCNC: 101 MMOL/L
CHOLEST SERPL-MCNC: 163 MG/DL
CO2 SERPL-SCNC: 26 MMOL/L
CREAT SERPL-MCNC: 1.31 MG/DL
EOSINOPHIL # BLD AUTO: 0.22 K/UL
EOSINOPHIL NFR BLD AUTO: 4.5 %
ESTIMATED AVERAGE GLUCOSE: 117 MG/DL
FOLATE SERPL-MCNC: 13.6 NG/ML
GLUCOSE SERPL-MCNC: 107 MG/DL
HBA1C MFR BLD HPLC: 5.7 %
HCT VFR BLD CALC: 45.8 %
HCV AB SER QL: NONREACTIVE
HCV S/CO RATIO: 0.13 S/CO
HDLC SERPL-MCNC: 44 MG/DL
HGB BLD-MCNC: 14.3 G/DL
HIV1+2 AB SPEC QL IA.RAPID: NONREACTIVE
HSV 1+2 IGG SER IA-IMP: POSITIVE
HSV 1+2 IGG SER IA-IMP: POSITIVE
HSV1 IGG SER QL: 40.9 INDEX
HSV2 IGG SER QL: 1.85 INDEX
IMM GRANULOCYTES NFR BLD AUTO: 0 %
LDLC SERPL CALC-MCNC: 98 MG/DL
LYMPHOCYTES # BLD AUTO: 1.97 K/UL
LYMPHOCYTES NFR BLD AUTO: 40.7 %
MAGNESIUM SERPL-MCNC: 2.2 MG/DL
MAN DIFF?: NORMAL
MCHC RBC-ENTMCNC: 26.4 PG
MCHC RBC-ENTMCNC: 31.2 GM/DL
MCV RBC AUTO: 84.5 FL
MONOCYTES # BLD AUTO: 0.52 K/UL
MONOCYTES NFR BLD AUTO: 10.7 %
N GONORRHOEA RRNA SPEC QL NAA+PROBE: NOT DETECTED
NEUTROPHILS # BLD AUTO: 2.08 K/UL
NEUTROPHILS NFR BLD AUTO: 43.1 %
NONHDLC SERPL-MCNC: 119 MG/DL
PLATELET # BLD AUTO: 307 K/UL
POTASSIUM SERPL-SCNC: 3.6 MMOL/L
PROT SERPL-MCNC: 7.6 G/DL
RBC # BLD: 5.42 M/UL
RBC # FLD: 14.3 %
SODIUM SERPL-SCNC: 142 MMOL/L
SOURCE AMPLIFICATION: NORMAL
T PALLIDUM AB SER QL IA: NEGATIVE
TRIGL SERPL-MCNC: 106 MG/DL
VIT B12 SERPL-MCNC: >2000 PG/ML
WBC # FLD AUTO: 4.84 K/UL

## 2022-05-13 ENCOUNTER — RX RENEWAL (OUTPATIENT)
Age: 32
End: 2022-05-13

## 2022-06-15 ENCOUNTER — RX RENEWAL (OUTPATIENT)
Age: 32
End: 2022-06-15

## 2022-06-23 ENCOUNTER — APPOINTMENT (OUTPATIENT)
Dept: INTERNAL MEDICINE | Facility: CLINIC | Age: 32
End: 2022-06-23
Payer: MEDICAID

## 2022-06-23 ENCOUNTER — NON-APPOINTMENT (OUTPATIENT)
Age: 32
End: 2022-06-23

## 2022-06-23 VITALS
WEIGHT: 229 LBS | RESPIRATION RATE: 16 BRPM | SYSTOLIC BLOOD PRESSURE: 148 MMHG | OXYGEN SATURATION: 99 % | DIASTOLIC BLOOD PRESSURE: 86 MMHG | HEIGHT: 73 IN | BODY MASS INDEX: 30.35 KG/M2 | HEART RATE: 74 BPM

## 2022-06-23 VITALS — DIASTOLIC BLOOD PRESSURE: 80 MMHG | SYSTOLIC BLOOD PRESSURE: 142 MMHG

## 2022-06-23 VITALS — DIASTOLIC BLOOD PRESSURE: 86 MMHG | SYSTOLIC BLOOD PRESSURE: 132 MMHG

## 2022-06-23 DIAGNOSIS — Z78.9 OTHER SPECIFIED HEALTH STATUS: ICD-10-CM

## 2022-06-23 DIAGNOSIS — Z23 ENCOUNTER FOR IMMUNIZATION: ICD-10-CM

## 2022-06-23 PROCEDURE — 99395 PREV VISIT EST AGE 18-39: CPT | Mod: 25

## 2022-06-23 PROCEDURE — 93000 ELECTROCARDIOGRAM COMPLETE: CPT

## 2022-06-23 NOTE — HEALTH RISK ASSESSMENT
[Never] : Never [Yes] : Yes [Monthly or less (1 pt)] : Monthly or less (1 point) [1 or 2 (0 pts)] : 1 or 2 (0 points) [Never (0 pts)] : Never (0 points) [No] : In the past 12 months have you used drugs other than those required for medical reasons? No [No falls in past year] : Patient reported no falls in the past year [0] : 2) Feeling down, depressed, or hopeless: Not at all (0) [PHQ-2 Negative - No further assessment needed] : PHQ-2 Negative - No further assessment needed [None] : None [Employed] : employed [Single] : single [Feels Safe at Home] : Feels safe at home [Fully functional (bathing, dressing, toileting, transferring, walking, feeding)] : Fully functional (bathing, dressing, toileting, transferring, walking, feeding) [Fully functional (using the telephone, shopping, preparing meals, housekeeping, doing laundry, using] : Fully functional and needs no help or supervision to perform IADLs (using the telephone, shopping, preparing meals, housekeeping, doing laundry, using transportation, managing medications and managing finances) [Smoke Detector] : smoke detector [Carbon Monoxide Detector] : carbon monoxide detector [Seat Belt] :  uses seat belt [Sunscreen] : uses sunscreen [Audit-CScore] : 1 [[id number]] : 0 [Change in mental status noted] : No change in mental status noted [High Risk Behavior] : no high risk behavior [Reports changes in hearing] : Reports no changes in hearing [Reports changes in vision] : Reports no changes in vision [Reports changes in dental health] : Reports no changes in dental health

## 2022-06-23 NOTE — PHYSICAL EXAM
[No Carotid Bruits] : no carotid bruits [No Abdominal Bruit] : a ~M bruit was not heard ~T in the abdomen [Pedal Pulses Present] : the pedal pulses are present [No Edema] : there was no peripheral edema [No Palpable Aorta] : no palpable aorta [Normal Posterior Cervical Nodes] : no posterior cervical lymphadenopathy [Normal Anterior Cervical Nodes] : no anterior cervical lymphadenopathy [No CVA Tenderness] : no CVA  tenderness [No Spinal Tenderness] : no spinal tenderness [Normal] : normal gait, coordination grossly intact, no focal deficits and deep tendon reflexes were 2+ and symmetric [Speech Grossly Normal] : speech grossly normal [Memory Grossly Normal] : memory grossly normal [Normal Affect] : the affect was normal [Alert and Oriented x3] : oriented to person, place, and time [Normal Mood] : the mood was normal [Normal Insight/Judgement] : insight and judgment were intact [Normal TMs] : both tympanic membranes were normal

## 2022-06-23 NOTE — HISTORY OF PRESENT ILLNESS
[FreeTextEntry1] : annual PE\par htn, lumbar radic, obesity, hypokalemia, el cr [de-identified] : Pt is a 32 y/o male with a hx of asthma - reports that he grew out of it, ptsd in the past who s/p admission to [facility] 2/16-2/18/18 with sciatica and hypertensive emergency. with el cr and low pot on labs. better with repeat testing.\par Found to have el a1c and low vit D. B12 low nl. \par Last Cr 1.31\par \par Has been taking vit D, B and MVI - fatigue has been better\par BP improved on rx - has been taking clonidine, amlodipine, hctz - reported as controlled at home.\par Has reduced sodium in the diet - healthy. not a smoker, occ caffeine, rare etoh. \par Has been exercising - occasional. Has been trying to hydrate.\par wt - some loss.\par Reprots that the back has been doing well. has been doing yoga and stretches. has been working out. has been running. Dec b/c of travelling.\par continues not to have any asthma sx.\par No noted CV sx. \par No GI sx. \par No urinary sx.\par No other noted musculoskeletal sx.\par No noted neuro sx. \par \par denies std risk, requesting check. \par \par dental - has apt next mo, ophtho - has gone 5/22\par \par gets Flu vaccine\par tdap - 2/21

## 2022-07-05 ENCOUNTER — NON-APPOINTMENT (OUTPATIENT)
Age: 32
End: 2022-07-05

## 2022-07-05 ENCOUNTER — EMERGENCY (EMERGENCY)
Facility: HOSPITAL | Age: 32
LOS: 0 days | Discharge: HOME | End: 2022-07-05
Attending: EMERGENCY MEDICINE

## 2022-07-05 VITALS
RESPIRATION RATE: 128 BRPM | HEIGHT: 73 IN | TEMPERATURE: 100 F | DIASTOLIC BLOOD PRESSURE: 84 MMHG | SYSTOLIC BLOOD PRESSURE: 142 MMHG | WEIGHT: 229.06 LBS | HEART RATE: 128 BPM | OXYGEN SATURATION: 99 %

## 2022-07-05 VITALS
RESPIRATION RATE: 18 BRPM | HEART RATE: 80 BPM | SYSTOLIC BLOOD PRESSURE: 129 MMHG | TEMPERATURE: 98 F | DIASTOLIC BLOOD PRESSURE: 87 MMHG | OXYGEN SATURATION: 98 %

## 2022-07-05 DIAGNOSIS — K64.9 UNSPECIFIED HEMORRHOIDS: ICD-10-CM

## 2022-07-05 DIAGNOSIS — R00.0 TACHYCARDIA, UNSPECIFIED: ICD-10-CM

## 2022-07-05 DIAGNOSIS — K62.89 OTHER SPECIFIED DISEASES OF ANUS AND RECTUM: ICD-10-CM

## 2022-07-05 DIAGNOSIS — Z20.822 CONTACT WITH AND (SUSPECTED) EXPOSURE TO COVID-19: ICD-10-CM

## 2022-07-05 DIAGNOSIS — Z72.52 HIGH RISK HOMOSEXUAL BEHAVIOR: ICD-10-CM

## 2022-07-05 DIAGNOSIS — I10 ESSENTIAL (PRIMARY) HYPERTENSION: ICD-10-CM

## 2022-07-05 DIAGNOSIS — F17.200 NICOTINE DEPENDENCE, UNSPECIFIED, UNCOMPLICATED: ICD-10-CM

## 2022-07-05 DIAGNOSIS — R50.9 FEVER, UNSPECIFIED: ICD-10-CM

## 2022-07-05 LAB
ALBUMIN SERPL ELPH-MCNC: 3.7 G/DL — SIGNIFICANT CHANGE UP (ref 3.3–5)
ALP SERPL-CCNC: 87 U/L — SIGNIFICANT CHANGE UP (ref 40–120)
ALT FLD-CCNC: 128 U/L — HIGH (ref 12–78)
ANION GAP SERPL CALC-SCNC: 10 MMOL/L — SIGNIFICANT CHANGE UP (ref 5–17)
APPEARANCE UR: CLEAR — SIGNIFICANT CHANGE UP
APTT BLD: 28.9 SEC — SIGNIFICANT CHANGE UP (ref 27.5–35.5)
AST SERPL-CCNC: 93 U/L — HIGH (ref 15–37)
BASOPHILS # BLD AUTO: 0 K/UL — SIGNIFICANT CHANGE UP (ref 0–0.2)
BASOPHILS NFR BLD AUTO: 0 % — SIGNIFICANT CHANGE UP (ref 0–2)
BILIRUB SERPL-MCNC: 0.7 MG/DL — SIGNIFICANT CHANGE UP (ref 0.2–1.2)
BILIRUB UR-MCNC: NEGATIVE — SIGNIFICANT CHANGE UP
BUN SERPL-MCNC: 15 MG/DL — SIGNIFICANT CHANGE UP (ref 7–23)
CALCIUM SERPL-MCNC: 9.2 MG/DL — SIGNIFICANT CHANGE UP (ref 8.5–10.1)
CHLORIDE SERPL-SCNC: 97 MMOL/L — SIGNIFICANT CHANGE UP (ref 96–108)
CO2 SERPL-SCNC: 28 MMOL/L — SIGNIFICANT CHANGE UP (ref 22–31)
COLOR SPEC: YELLOW — SIGNIFICANT CHANGE UP
CREAT SERPL-MCNC: 1.64 MG/DL — HIGH (ref 0.5–1.3)
DIFF PNL FLD: ABNORMAL
EGFR: 56 ML/MIN/1.73M2 — LOW
EOSINOPHIL # BLD AUTO: 0 K/UL — SIGNIFICANT CHANGE UP (ref 0–0.5)
EOSINOPHIL NFR BLD AUTO: 0 % — SIGNIFICANT CHANGE UP (ref 0–6)
FLUAV AG NPH QL: SIGNIFICANT CHANGE UP
FLUBV AG NPH QL: SIGNIFICANT CHANGE UP
GIANT PLATELETS BLD QL SMEAR: PRESENT — SIGNIFICANT CHANGE UP
GLUCOSE SERPL-MCNC: 128 MG/DL — HIGH (ref 70–99)
GLUCOSE UR QL: NEGATIVE MG/DL — SIGNIFICANT CHANGE UP
HCT VFR BLD CALC: 41.9 % — SIGNIFICANT CHANGE UP (ref 39–50)
HGB BLD-MCNC: 13.7 G/DL — SIGNIFICANT CHANGE UP (ref 13–17)
HIV 1 & 2 AB SERPL IA.RAPID: SIGNIFICANT CHANGE UP
INR BLD: 1.13 RATIO — SIGNIFICANT CHANGE UP (ref 0.88–1.16)
KETONES UR-MCNC: NEGATIVE — SIGNIFICANT CHANGE UP
LACTATE SERPL-SCNC: 1.1 MMOL/L — SIGNIFICANT CHANGE UP (ref 0.7–2)
LACTATE SERPL-SCNC: 2.1 MMOL/L — HIGH (ref 0.7–2)
LEUKOCYTE ESTERASE UR-ACNC: NEGATIVE — SIGNIFICANT CHANGE UP
LG PLATELETS BLD QL AUTO: SLIGHT — SIGNIFICANT CHANGE UP
LYMPHOCYTES # BLD AUTO: 2.28 K/UL — SIGNIFICANT CHANGE UP (ref 1–3.3)
LYMPHOCYTES # BLD AUTO: 33 % — SIGNIFICANT CHANGE UP (ref 13–44)
MAGNESIUM SERPL-MCNC: 2.1 MG/DL — SIGNIFICANT CHANGE UP (ref 1.6–2.6)
MANUAL SMEAR VERIFICATION: SIGNIFICANT CHANGE UP
MCHC RBC-ENTMCNC: 25.9 PG — LOW (ref 27–34)
MCHC RBC-ENTMCNC: 32.7 G/DL — SIGNIFICANT CHANGE UP (ref 32–36)
MCV RBC AUTO: 79.2 FL — LOW (ref 80–100)
MICROCYTES BLD QL: SLIGHT — SIGNIFICANT CHANGE UP
MONOCYTES # BLD AUTO: 0.9 K/UL — SIGNIFICANT CHANGE UP (ref 0–0.9)
MONOCYTES NFR BLD AUTO: 13 % — SIGNIFICANT CHANGE UP (ref 2–14)
NEUTROPHILS # BLD AUTO: 3.73 K/UL — SIGNIFICANT CHANGE UP (ref 1.8–7.4)
NEUTROPHILS NFR BLD AUTO: 54 % — SIGNIFICANT CHANGE UP (ref 43–77)
NEUTS VAC BLD QL SMEAR: SLIGHT — SIGNIFICANT CHANGE UP
NITRITE UR-MCNC: NEGATIVE — SIGNIFICANT CHANGE UP
NRBC # BLD: 0 /100 — SIGNIFICANT CHANGE UP (ref 0–0)
NRBC # BLD: SIGNIFICANT CHANGE UP /100 WBCS (ref 0–0)
PH UR: 6 — SIGNIFICANT CHANGE UP (ref 5–8)
PLAT MORPH BLD: ABNORMAL
PLATELET # BLD AUTO: 228 K/UL — SIGNIFICANT CHANGE UP (ref 150–400)
POLYCHROMASIA BLD QL SMEAR: SLIGHT — SIGNIFICANT CHANGE UP
POTASSIUM SERPL-MCNC: 3.1 MMOL/L — LOW (ref 3.5–5.3)
POTASSIUM SERPL-SCNC: 3.1 MMOL/L — LOW (ref 3.5–5.3)
PROT SERPL-MCNC: 8 GM/DL — SIGNIFICANT CHANGE UP (ref 6–8.3)
PROT UR-MCNC: 15 MG/DL
PROTHROM AB SERPL-ACNC: 13.5 SEC — HIGH (ref 10.5–13.4)
RBC # BLD: 5.29 M/UL — SIGNIFICANT CHANGE UP (ref 4.2–5.8)
RBC # FLD: 13.8 % — SIGNIFICANT CHANGE UP (ref 10.3–14.5)
RBC BLD AUTO: NORMAL — SIGNIFICANT CHANGE UP
RBC CASTS # UR COMP ASSIST: ABNORMAL /HPF (ref 0–4)
SARS-COV-2 RNA SPEC QL NAA+PROBE: SIGNIFICANT CHANGE UP
SODIUM SERPL-SCNC: 135 MMOL/L — SIGNIFICANT CHANGE UP (ref 135–145)
SP GR SPEC: 1.01 — SIGNIFICANT CHANGE UP (ref 1.01–1.02)
UROBILINOGEN FLD QL: NEGATIVE MG/DL — SIGNIFICANT CHANGE UP
WBC # BLD: 6.91 K/UL — SIGNIFICANT CHANGE UP (ref 3.8–10.5)
WBC # FLD AUTO: 6.91 K/UL — SIGNIFICANT CHANGE UP (ref 3.8–10.5)
WBC UR QL: SIGNIFICANT CHANGE UP

## 2022-07-05 PROCEDURE — 99285 EMERGENCY DEPT VISIT HI MDM: CPT

## 2022-07-05 PROCEDURE — 93010 ELECTROCARDIOGRAM REPORT: CPT

## 2022-07-05 PROCEDURE — 71045 X-RAY EXAM CHEST 1 VIEW: CPT | Mod: 26

## 2022-07-05 PROCEDURE — 74177 CT ABD & PELVIS W/CONTRAST: CPT | Mod: 26,MA

## 2022-07-05 RX ORDER — CEFTRIAXONE 500 MG/1
1000 INJECTION, POWDER, FOR SOLUTION INTRAMUSCULAR; INTRAVENOUS ONCE
Refills: 0 | Status: COMPLETED | OUTPATIENT
Start: 2022-07-05 | End: 2022-07-05

## 2022-07-05 RX ORDER — POTASSIUM CHLORIDE 20 MEQ
40 PACKET (EA) ORAL ONCE
Refills: 0 | Status: COMPLETED | OUTPATIENT
Start: 2022-07-05 | End: 2022-07-05

## 2022-07-05 RX ORDER — METRONIDAZOLE 500 MG
500 TABLET ORAL ONCE
Refills: 0 | Status: COMPLETED | OUTPATIENT
Start: 2022-07-05 | End: 2022-07-05

## 2022-07-05 RX ORDER — SODIUM CHLORIDE 9 MG/ML
2500 INJECTION, SOLUTION INTRAVENOUS ONCE
Refills: 0 | Status: COMPLETED | OUTPATIENT
Start: 2022-07-05 | End: 2022-07-05

## 2022-07-05 RX ORDER — ACETAMINOPHEN 500 MG
975 TABLET ORAL ONCE
Refills: 0 | Status: COMPLETED | OUTPATIENT
Start: 2022-07-05 | End: 2022-07-05

## 2022-07-05 RX ORDER — CEFUROXIME AXETIL 250 MG
1 TABLET ORAL
Qty: 20 | Refills: 0
Start: 2022-07-05 | End: 2022-07-14

## 2022-07-05 RX ORDER — METRONIDAZOLE 500 MG
1 TABLET ORAL
Qty: 30 | Refills: 0
Start: 2022-07-05 | End: 2022-07-14

## 2022-07-05 RX ADMIN — Medication 100 MILLIGRAM(S): at 10:06

## 2022-07-05 RX ADMIN — SODIUM CHLORIDE 2500 MILLILITER(S): 9 INJECTION, SOLUTION INTRAVENOUS at 09:15

## 2022-07-05 RX ADMIN — Medication 975 MILLIGRAM(S): at 10:11

## 2022-07-05 RX ADMIN — CEFTRIAXONE 100 MILLIGRAM(S): 500 INJECTION, POWDER, FOR SOLUTION INTRAMUSCULAR; INTRAVENOUS at 09:15

## 2022-07-05 RX ADMIN — CEFTRIAXONE 1000 MILLIGRAM(S): 500 INJECTION, POWDER, FOR SOLUTION INTRAMUSCULAR; INTRAVENOUS at 10:12

## 2022-07-05 RX ADMIN — Medication 975 MILLIGRAM(S): at 09:15

## 2022-07-05 RX ADMIN — Medication 40 MILLIEQUIVALENT(S): at 10:30

## 2022-07-05 NOTE — ED PROVIDER NOTE - OBJECTIVE STATEMENT
31M hx htn, hemorrhoids, MSM, pw rectal pain. notes 3 days of rectal discharge in the setting of hemorrhoids. no vomiting diarrhea testicular pain dysuria. ros neg for ha, vision loss, rhinorrhea, cp, sob, abd pain, numbness, rash, bleeding, anxiety

## 2022-07-05 NOTE — ED PROVIDER NOTE - NSFOLLOWUPINSTRUCTIONS_ED_ALL_ED_FT
You have PROCTITIS - infection of the rectum and anus.     Take both antibiotics completely as prescribed: METRONIDAZOLE and CEFUROXIME.    Avoid anal sex until after antibiotics are complete.     Call the GASTROENTEROLOGIST for a follow up appointment.

## 2022-07-05 NOTE — ED ADULT TRIAGE NOTE - NSPATIENTFLAG_GEN_A_ER
Purple DH (Discharge Huddle; Vulnerable Patient) Red S (Sepsis)/Purple DH (Discharge Huddle; Vulnerable Patient)

## 2022-07-05 NOTE — ED ADULT NURSE REASSESSMENT NOTE - NS ED NURSE REASSESS COMMENT FT1
patient is A&Ox4. Breathing unlabored on RA. patient resting in stretcher. No acute or respiratory distress noted. patient to be discharged home with specialist referrals. Patient education provided on Proctitis and abx treatment. Patient verbalizes understanding.

## 2022-07-05 NOTE — ED PROVIDER NOTE - PHYSICAL EXAMINATION
Gen: Alert, NAD  Head: NC, AT   Eyes: PERRL, EOMI, normal lids/conjunctiva  ENT: normal hearing, patent oropharynx without erythema/exudate, uvula midline  Neck: supple, no tenderness, Trachea midline  Pulm: Bilateral BS, normal resp effort, no wheeze/stridor/retractions  CV: RRR, no M/R/G, 2+ radial and dp pulses bl, no edema  Abd: soft, NT/ND, +BS, no hepatosplenomegaly  Mskel: extremities x4 with normal ROM and no joint effusions. no ctl spine ttp.   Skin: no rash, no bruising   Neuro: AAOx3, no sensory/motor deficits, CN 2-12 intact  Anal: several papules around rectum. none fluctuant. inferior one is open.

## 2022-07-05 NOTE — ED ADULT NURSE NOTE - ED STAT RN HANDOFF DETAILS
Report received from Mercedez Sheriff RN. patient on cardiac monitor. patient in no acute or respiratory distress. denies any pain or discomfort at this time.

## 2022-07-05 NOTE — ED PROVIDER NOTE - CLINICAL SUMMARY MEDICAL DECISION MAKING FREE TEXT BOX
fever. I read ekg as sinus tach rate 115, no st elevation or depression, qtc 434, narrow qrs, lateral biphasic t waves. fever. I read ekg as sinus tach rate 115, no st elevation or depression, qtc 434, narrow qrs, lateral biphasic t waves.  ct shows proctitis. no abscess. vitals improved. ok for outpatient management.   start abx.

## 2022-07-05 NOTE — ED ADULT TRIAGE NOTE - CHIEF COMPLAINT QUOTE
swollen lymph nodes to groin with fatigue Pt was self treating a hemorrhoid, but now has yellow discharge from rectum. Pt tachy

## 2022-07-05 NOTE — ED PROVIDER NOTE - CARE PROVIDER_API CALL
Aman Rasmussen)  Internal Medicine  [address], [address]  [place] [address]  Phone: [phone]  Fax: [phone]  Follow Up Time: Urgent    Devante Ying)  Medicine  [address], [address]  [place] [address]  Phone: [phone]  Fax: [phone]  Follow Up Time: Urgent

## 2022-07-05 NOTE — ED PROVIDER NOTE - PRO INTERPRETER NEED 2
Grounding pad removed, skin intact.  Band-Aid applied to site. Patient transferred to recovery in stable condition.    Patient tolerated procedure well with no complications.  Report given to Perla MATHUR RN   English

## 2022-07-05 NOTE — ED PROVIDER NOTE - PROVIDER TOKENS
PROVIDER:[TOKEN:[[id number]],FOLLOWUP:[Urgent]],PROVIDER:[TOKEN:[[id number]],FOLLOWUP:[Urgent]]

## 2022-07-05 NOTE — ED ADULT NURSE NOTE - OBJECTIVE STATEMENT
pt A&Ox4 PMH HTN, Hemorrhoids present today with c/o swollen lymph nodes to groin with fatigue. Pt was self treating a hemorrhoid, but now has yellow discharge from rectum. Denies abdominal pain, n/v/CP/SOB

## 2022-07-06 ENCOUNTER — TRANSCRIPTION ENCOUNTER (OUTPATIENT)
Age: 32
End: 2022-07-06

## 2022-07-06 LAB
C TRACH RRNA SPEC QL NAA+PROBE: SIGNIFICANT CHANGE UP
CULTURE RESULTS: NO GROWTH — SIGNIFICANT CHANGE UP
N GONORRHOEA RRNA SPEC QL NAA+PROBE: SIGNIFICANT CHANGE UP
SPECIMEN SOURCE: SIGNIFICANT CHANGE UP
SPECIMEN SOURCE: SIGNIFICANT CHANGE UP

## 2022-07-10 LAB
CULTURE RESULTS: SIGNIFICANT CHANGE UP
CULTURE RESULTS: SIGNIFICANT CHANGE UP
SPECIMEN SOURCE: SIGNIFICANT CHANGE UP
SPECIMEN SOURCE: SIGNIFICANT CHANGE UP

## 2022-07-19 LAB
25(OH)D3 SERPL-MCNC: 41.2 NG/ML
ALBUMIN SERPL ELPH-MCNC: 4.7 G/DL
ALP BLD-CCNC: 71 U/L
ALT SERPL-CCNC: 26 U/L
ANION GAP SERPL CALC-SCNC: 13 MMOL/L
APPEARANCE: CLEAR
AST SERPL-CCNC: 25 U/L
BACTERIA: NEGATIVE
BILIRUB SERPL-MCNC: 0.5 MG/DL
BILIRUBIN URINE: NEGATIVE
BLOOD URINE: NEGATIVE
BUN SERPL-MCNC: 9 MG/DL
C TRACH RRNA SPEC QL NAA+PROBE: NOT DETECTED
CALCIUM SERPL-MCNC: 9.9 MG/DL
CHLORIDE SERPL-SCNC: 100 MMOL/L
CHOLEST SERPL-MCNC: 172 MG/DL
CO2 SERPL-SCNC: 26 MMOL/L
COLOR: NORMAL
CREAT SERPL-MCNC: 1.12 MG/DL
CREAT SPEC-SCNC: 115 MG/DL
CREAT/PROT UR: 0.1 RATIO
EGFR: 89 ML/MIN/1.73M2
FERRITIN SERPL-MCNC: 108 NG/ML
FOLATE SERPL-MCNC: 15.2 NG/ML
GLUCOSE QUALITATIVE U: NEGATIVE
GLUCOSE SERPL-MCNC: 103 MG/DL
HCV AB SER QL: NONREACTIVE
HCV S/CO RATIO: 0.11 S/CO
HDLC SERPL-MCNC: 44 MG/DL
HIV1+2 AB SPEC QL IA.RAPID: NONREACTIVE
HSV 1+2 IGG SER IA-IMP: POSITIVE
HSV 1+2 IGG SER IA-IMP: POSITIVE
HSV1 IGG SER QL: 35.6 INDEX
HSV2 IGG SER QL: 1.32 INDEX
HYALINE CASTS: 0 /LPF
IRON SATN MFR SERPL: 23 %
IRON SERPL-MCNC: 74 UG/DL
KETONES URINE: NEGATIVE
LDLC SERPL CALC-MCNC: 106 MG/DL
LEUKOCYTE ESTERASE URINE: NEGATIVE
MAGNESIUM SERPL-MCNC: 1.9 MG/DL
MICROSCOPIC-UA: NORMAL
N GONORRHOEA RRNA SPEC QL NAA+PROBE: NOT DETECTED
NITRITE URINE: NEGATIVE
NONHDLC SERPL-MCNC: 128 MG/DL
PH URINE: 6
POTASSIUM SERPL-SCNC: 3.6 MMOL/L
PROT SERPL-MCNC: 7.3 G/DL
PROT UR-MCNC: 10 MG/DL
PROTEIN URINE: NEGATIVE
RED BLOOD CELLS URINE: 3 /HPF
SODIUM SERPL-SCNC: 140 MMOL/L
SOURCE AMPLIFICATION: NORMAL
SPECIFIC GRAVITY URINE: 1.01
SQUAMOUS EPITHELIAL CELLS: 0 /HPF
T PALLIDUM AB SER QL IA: NEGATIVE
TIBC SERPL-MCNC: 319 UG/DL
TRIGL SERPL-MCNC: 108 MG/DL
TSH SERPL-ACNC: 1.97 UIU/ML
UIBC SERPL-MCNC: 244 UG/DL
UROBILINOGEN URINE: NORMAL
VIT B12 SERPL-MCNC: 866 PG/ML
WHITE BLOOD CELLS URINE: 0 /HPF

## 2022-11-18 NOTE — ED ADULT TRIAGE NOTE - HEIGHT IN INCHES
SUBJECTIVE:  Deny Maloney is a 2 year old male who presents for a well child exam.  Patient presents with (s).  Foster mom-Ivet Wagner ([phone])- since  working on rescheduling psychiatry/ counseling  Has followup w dentist after dental restoration  Foster mom concerned about his \"clumsiness\" .he tends to fall frequently. No particular pattern. Able to run/ walk/ jump He is in the process of getting an IEP evaluation   Epistaxis- has had 1-2 episodes  Uses albuterol when active 1-2 times / week  Needs refill on HC    Was seen in urgent care for GE yesterday- covid / flu testing negative   Significant changes since last visit:  no    Past Histories:  Problem list, Allergies, Medications, Medical history, Surgical history, Family history reviewed and updated.  Yes    Family Cardiovascular Risk Factors:   Any family history of premature coronary artery disease in 1st degree relative (i.e., heart attack, confirmed angina, interventions for CAD, stroke, or sudden cardiac disease in a male parent or sibling before 55 years of age, or a female parent or sibling before 65 years of age), parent with TC >240 or known dyslipidemia: adopted/ unknown  NUTRITION:   Diet history:  mostly well balanced , well balanced, including meat and some fruits/ vegetables daily  Calcium intake:  Calcium intake mostly adequate for age   Anticipatory Guidance provided:   Recc 2-3 serving/d of good Ca sources  Continue/start/resume daily MVI (primarily for vit D)      TOLIETING:  not fully potty trained working on it. 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    DEVELOPMENT   Any parent concerns  re:  Development?  Will be undergoing IEP  (by 1 years)   [x]  YES    []  NO      []  UNKNOWN    3 word sentences 75% intelligible?   [x]  YES    []  NO      []  UNKNOWN    Parents understand speech   [x]  YES    []  NO      []  UNKNOWN    Understands and follows directions?   [x]  YES    []  NO      []  UNKNOWN    Appropriate response to most questions?   [x]  YES    []  NO      []  UNKNOWN    Knows name, age, sex   [x]  YES    []  NO      []  UNKNOWN    Performs some self-care (feeding, dressing)   [x]  YES    []  NO      []  UNKNOWN    Jumps in place   [x]  YES    []  NO      []  UNKNOWN    Shows some imaginative behavior   [x]  YES    []  NO      []  UNKNOWN    Copies [place] and cross   [x]  YES    []  NO      []  UNKNOWN    Knows first and last name (3 yo)   [x]  YES    []  NO      []  UNKNOWN   Talks about daily routine (3 yo)   [x]  YES    []  NO      []  UNKNOWN    Sings a song (3 yo)   [x]  YES    []  NO      []  UNKNOWN    Knows gender (self and others) (1yo)   []  YES    [x]  NO      []  UNKNOWN    Draws 3 part person (3 yo)   [x]  YES    []  NO      []  UNKNOWN    Stacks 10 blocks (3 yo)   [x]  YES    []  NO      []  UNKNOWN    Hops, jumps on one foot (3 yo)  [x]  YES    []  NO      []  UNKNOWN    Throws ball overhand (3 yo)   Reviewed development as above, encouraged reading / books, limit or avoid TV:  yes    FAMILY / HOME / SAFETY:  Living at home:  (s)    Obvious lead risk present in home:  No   Car safety:  rides in booster seat (recommended no sooner than 40 lbs or 4 years)  Get at least 1 hour of physical activity per day?  Yes    Electronic screen time less than 2 hours/day?  Yes  Bike helmet use:  not asked  Access to pools / other bodies of water:  not currently  Anticipatory Guidance provided:  Recommended booster seat (recommended no sooner than 40 lbs or 4 years)    REVIEW OF SYSTEMS:  No additional remarkable ROS except as documented in \"Concerns raised\".    PHYSICAL  EXAM:  VITAL SIGNS:    Visit Vitals  BP 85/57   Pulse 100   Ht 3' 5\" (1.041 m)   Wt 18.1 kg (40 lb)   BMI 16.73 kg/m²    70 %ile (Z= 0.54) based on [facility] (Boys, 2-20 Years) weight-for-age data using vitals from 11/18/2022.  GENERAL:  alert and no distress  SKIN:  Warm and well perfused, no rashes or abnormal dyspigmentation  HEAD:  Normocephalic, atraumatic, no obvious abnormalities  EYES:  sclera clear, conjugate gaze  NOSE:  nasal mucosa appears normal, turbinates not swollen, no rhinnorhea  EARS:  easily visualized, normal bilaterally  THROAT:  oral mucosa normal without lesions, pharynx clear  NECK:  normal, supple, no masses, no adenopathy  HEART:  regular rate & rhythm, normal S1 and S2, no murmurs  LUNGS:   good air movement, clear, equal breath sounds bilaterally to auscultation  ABDOMEN:  soft, nontender, without masses  GENITOURINARY:  Testes present, descended bilat, circumcised  EXTREMITIES:  no obvious abnormalities per inspection of all 4 extremities  BACK:  appears normal, no obvious abnormalities, no defects (daniel / dimples/ pits) in lumbosacral region  NEUROLOGIC:  Alert, grossly normal exam  Nl gait  Normal neuro exam  Nl strength  Able to hop/ jump without difficulties    Immunizations:  Immunization Status:  Due for recommended vaccines- see orders  History of immunization reactions:  None  Updated immunizations today -   The parent was counseled about each component of the vaccine(s), including possible side effects, risks, and benefits.    Other screening recommended:   CBC / Pb recommended and ordered, parent given instructions to go to lab  Vision screening:  Normal  Will check on Covid vaccine w   Assessment:  1. Need for vaccination    2. Mild intermittent asthma without complication    3. Eczema, unspecified type    4. Encounter for routine child health examination without abnormal findings    5. Epistaxis    6. Child in foster care      Orders Placed This Encounter   • MMRV,  MEASLES MUMPS RUBELLA VARICELLA VACC (PROQUAD)   • DTAP IPV VACC 4 THRU 6 YRS   • INFLUENZA QUADRIVALENT SPLIT PRES FREE 0.5 ML VACC, IM (FLULAVAL,FLUARIX,FLUZONE)   • Lead Blood/Venous   • hydroCORTisone (CORTIZONE) 2.5 % ointment       Also,     1. Orders for immunizations given today per the recommended schedule.  VIS for Immunizations given.  Update immunizations today - The parent was counseled about each component of the vaccine, including possible side effects, risks, and benefits.  Anticipatory guidance provided re:  Nutrition, sleep, safety as documented above in note.  Foster mom notes clumsiness - I had a normal exam. Recommend discussing with OT and PT during IEP.  Reviewed Mx of epistaxis  Follow up:  Return in about 6 months (around 5/18/2023).    Yesenia Rider MD       1

## 2022-11-24 ENCOUNTER — RX RENEWAL (OUTPATIENT)
Age: 32
End: 2022-11-24

## 2022-12-21 ENCOUNTER — APPOINTMENT (OUTPATIENT)
Dept: INTERNAL MEDICINE | Facility: CLINIC | Age: 32
End: 2022-12-21

## 2023-01-30 ENCOUNTER — RX RENEWAL (OUTPATIENT)
Age: 33
End: 2023-01-30

## 2023-02-23 ENCOUNTER — APPOINTMENT (OUTPATIENT)
Dept: INTERNAL MEDICINE | Facility: CLINIC | Age: 33
End: 2023-02-23
Payer: MEDICAID

## 2023-02-23 VITALS
RESPIRATION RATE: 16 BRPM | OXYGEN SATURATION: 99 % | WEIGHT: 226 LBS | DIASTOLIC BLOOD PRESSURE: 80 MMHG | SYSTOLIC BLOOD PRESSURE: 144 MMHG | BODY MASS INDEX: 29.95 KG/M2 | HEIGHT: 73 IN | HEART RATE: 124 BPM

## 2023-02-23 VITALS — SYSTOLIC BLOOD PRESSURE: 138 MMHG | DIASTOLIC BLOOD PRESSURE: 78 MMHG

## 2023-02-23 PROCEDURE — 99214 OFFICE O/P EST MOD 30 MIN: CPT | Mod: 25

## 2023-02-23 NOTE — HEALTH RISK ASSESSMENT
[Yes] : Yes [Monthly or less (1 pt)] : Monthly or less (1 point) [1 or 2 (0 pts)] : 1 or 2 (0 points) [Never (0 pts)] : Never (0 points) [No] : In the past 12 months have you used drugs other than those required for medical reasons? No [0] : 2) Feeling down, depressed, or hopeless: Not at all (0) [PHQ-2 Negative - No further assessment needed] : PHQ-2 Negative - No further assessment needed [Never] : Never [Audit-CScore] : 1 [[id number]] : 0

## 2023-02-23 NOTE — PHYSICAL EXAM
[Normal TMs] : both tympanic membranes were normal [No Carotid Bruits] : no carotid bruits [No Abdominal Bruit] : a ~M bruit was not heard ~T in the abdomen [Pedal Pulses Present] : the pedal pulses are present [No Edema] : there was no peripheral edema [No Palpable Aorta] : no palpable aorta [No CVA Tenderness] : no CVA  tenderness [No Spinal Tenderness] : no spinal tenderness [Normal] : normal gait, coordination grossly intact, no focal deficits and deep tendon reflexes were 2+ and symmetric [Speech Grossly Normal] : speech grossly normal [Memory Grossly Normal] : memory grossly normal [Normal Affect] : the affect was normal [Alert and Oriented x3] : oriented to person, place, and time [Normal Mood] : the mood was normal [Normal Insight/Judgement] : insight and judgment were intact

## 2023-02-23 NOTE — HISTORY OF PRESENT ILLNESS
[FreeTextEntry1] : htn, lumbar radic, obesity, hypokalemia, el cr [de-identified] : Pt is a 33 y/o male with a hx of asthma - reports that he grew out of it, ptsd in the past who s/p admission to [facility] 2/16-2/18/18 with sciatica and hypertensive emergency. with el cr and low pot on labs. better with repeat testing.\par Found to have el a1c and low vit D. B12 low nl. \par \par Has been taking vit D, B and MVI - fatigue has been better\par BP improved on rx - has been taking clonidine, amlodipine, hctz - reported as controlled at home - following regularly\par Has reduced sodium in the diet - healthy. not a smoker, occ caffeine, rare etoh. \par Has been exercising - occasional - worsened b/c busy at work. Has been trying to hydrate. \par wt - some loss.\par Reprots that the back has been doing well. has been doing yoga and stretches. has been working out. has been running. Dec b/c of travelling.\par continues not to have any asthma sx.\par No noted CV sx. \par No GI sx. \par No urinary sx.\par No other noted musculoskeletal sx.\par No noted neuro sx. \par \par Reports that he was treated for STD over the summer.  denies std risk, requesting check. \par \par dental - follows, ophtho - has gone 5/22\par \par gets Flu vaccine\par tdap - 2/21

## 2023-02-26 LAB
25(OH)D3 SERPL-MCNC: 34.1 NG/ML
ALBUMIN SERPL ELPH-MCNC: 5 G/DL
ALP BLD-CCNC: 73 U/L
ALT SERPL-CCNC: 35 U/L
ANION GAP SERPL CALC-SCNC: 13 MMOL/L
AST SERPL-CCNC: 29 U/L
BILIRUB SERPL-MCNC: 1 MG/DL
BUN SERPL-MCNC: 10 MG/DL
C TRACH RRNA SPEC QL NAA+PROBE: NOT DETECTED
CALCIUM SERPL-MCNC: 10.5 MG/DL
CHLORIDE SERPL-SCNC: 98 MMOL/L
CO2 SERPL-SCNC: 29 MMOL/L
CREAT SERPL-MCNC: 1.13 MG/DL
EGFR: 87 ML/MIN/1.73M2
ESTIMATED AVERAGE GLUCOSE: 114 MG/DL
FOLATE SERPL-MCNC: 19.6 NG/ML
GLUCOSE SERPL-MCNC: 110 MG/DL
HBA1C MFR BLD HPLC: 5.6 %
HCV AB SER QL: NONREACTIVE
HCV S/CO RATIO: 0.07 S/CO
HIV1+2 AB SPEC QL IA.RAPID: NONREACTIVE
HSV 1+2 IGG SER IA-IMP: POSITIVE
HSV 1+2 IGG SER IA-IMP: POSITIVE
HSV1 IGG SER QL: 41.4 INDEX
HSV2 IGG SER QL: 2.08 INDEX
MAGNESIUM SERPL-MCNC: 2.2 MG/DL
N GONORRHOEA RRNA SPEC QL NAA+PROBE: NOT DETECTED
POTASSIUM SERPL-SCNC: 3.8 MMOL/L
PROT SERPL-MCNC: 7.7 G/DL
SODIUM SERPL-SCNC: 140 MMOL/L
SOURCE AMPLIFICATION: NORMAL
T PALLIDUM AB SER QL IA: NEGATIVE
VIT B12 SERPL-MCNC: 511 PG/ML

## 2023-10-31 ENCOUNTER — RX RENEWAL (OUTPATIENT)
Age: 33
End: 2023-10-31

## 2023-11-29 ENCOUNTER — RX RENEWAL (OUTPATIENT)
Age: 33
End: 2023-11-29

## 2024-01-01 ENCOUNTER — RX RENEWAL (OUTPATIENT)
Age: 34
End: 2024-01-01

## 2024-04-03 ENCOUNTER — TRANSCRIPTION ENCOUNTER (OUTPATIENT)
Age: 34
End: 2024-04-03

## 2024-04-03 RX ORDER — CLONIDINE HYDROCHLORIDE 0.2 MG/1
0.2 TABLET ORAL
Qty: 90 | Refills: 5 | Status: ACTIVE | COMMUNITY
Start: 2018-06-21 | End: 1900-01-01

## 2024-04-03 RX ORDER — AMLODIPINE BESYLATE 10 MG/1
10 TABLET ORAL
Qty: 30 | Refills: 5 | Status: ACTIVE | COMMUNITY
Start: 2018-06-21 | End: 1900-01-01

## 2024-04-03 RX ORDER — HYDROCHLOROTHIAZIDE 25 MG/1
25 TABLET ORAL
Qty: 30 | Refills: 5 | Status: ACTIVE | COMMUNITY
Start: 2018-07-22 | End: 1900-01-01

## 2024-04-08 ENCOUNTER — APPOINTMENT (OUTPATIENT)
Dept: INTERNAL MEDICINE | Facility: CLINIC | Age: 34
End: 2024-04-08
Payer: MEDICAID

## 2024-04-08 ENCOUNTER — NON-APPOINTMENT (OUTPATIENT)
Age: 34
End: 2024-04-08

## 2024-04-08 VITALS
BODY MASS INDEX: 31.01 KG/M2 | OXYGEN SATURATION: 96 % | DIASTOLIC BLOOD PRESSURE: 92 MMHG | WEIGHT: 234 LBS | SYSTOLIC BLOOD PRESSURE: 150 MMHG | HEART RATE: 107 BPM | HEIGHT: 73 IN

## 2024-04-08 VITALS — SYSTOLIC BLOOD PRESSURE: 162 MMHG | DIASTOLIC BLOOD PRESSURE: 88 MMHG

## 2024-04-08 VITALS — SYSTOLIC BLOOD PRESSURE: 150 MMHG | DIASTOLIC BLOOD PRESSURE: 78 MMHG

## 2024-04-08 DIAGNOSIS — Z20.9 CONTACT WITH AND (SUSPECTED) EXPOSURE TO UNSPECIFIED COMMUNICABLE DISEASE: ICD-10-CM

## 2024-04-08 DIAGNOSIS — R73.09 OTHER ABNORMAL GLUCOSE: ICD-10-CM

## 2024-04-08 DIAGNOSIS — E66.9 OBESITY, UNSPECIFIED: ICD-10-CM

## 2024-04-08 DIAGNOSIS — M54.16 RADICULOPATHY, LUMBAR REGION: ICD-10-CM

## 2024-04-08 DIAGNOSIS — I10 ESSENTIAL (PRIMARY) HYPERTENSION: ICD-10-CM

## 2024-04-08 DIAGNOSIS — E55.9 VITAMIN D DEFICIENCY, UNSPECIFIED: ICD-10-CM

## 2024-04-08 DIAGNOSIS — E53.8 DEFICIENCY OF OTHER SPECIFIED B GROUP VITAMINS: ICD-10-CM

## 2024-04-08 DIAGNOSIS — R79.89 OTHER SPECIFIED ABNORMAL FINDINGS OF BLOOD CHEMISTRY: ICD-10-CM

## 2024-04-08 DIAGNOSIS — E87.6 HYPOKALEMIA: ICD-10-CM

## 2024-04-08 DIAGNOSIS — Z00.00 ENCOUNTER FOR GENERAL ADULT MEDICAL EXAMINATION W/OUT ABNORMAL FINDINGS: ICD-10-CM

## 2024-04-08 PROCEDURE — 99395 PREV VISIT EST AGE 18-39: CPT | Mod: 25

## 2024-04-08 PROCEDURE — 93000 ELECTROCARDIOGRAM COMPLETE: CPT | Mod: 59

## 2024-04-08 NOTE — HEALTH RISK ASSESSMENT
[Yes] : Yes [Monthly or less (1 pt)] : Monthly or less (1 point) [1 or 2 (0 pts)] : 1 or 2 (0 points) [Never (0 pts)] : Never (0 points) [No] : In the past 12 months have you used drugs other than those required for medical reasons? No [0] : 2) Feeling down, depressed, or hopeless: Not at all (0) [PHQ-2 Negative - No further assessment needed] : PHQ-2 Negative - No further assessment needed [None] : None [Employed] : employed [Single] : single [Feels Safe at Home] : Feels safe at home [Fully functional (bathing, dressing, toileting, transferring, walking, feeding)] : Fully functional (bathing, dressing, toileting, transferring, walking, feeding) [Fully functional (using the telephone, shopping, preparing meals, housekeeping, doing laundry, using] : Fully functional and needs no help or supervision to perform IADLs (using the telephone, shopping, preparing meals, housekeeping, doing laundry, using transportation, managing medications and managing finances) [Smoke Detector] : smoke detector [Carbon Monoxide Detector] : carbon monoxide detector [Seat Belt] :  uses seat belt [Sunscreen] : uses sunscreen [Never] : Never

## 2024-04-09 LAB
25(OH)D3 SERPL-MCNC: 33.9 NG/ML
ALBUMIN SERPL ELPH-MCNC: 4.8 G/DL
ALP BLD-CCNC: 72 U/L
ALT SERPL-CCNC: 38 U/L
ANION GAP SERPL CALC-SCNC: 16 MMOL/L
AST SERPL-CCNC: 49 U/L
BASOPHILS # BLD AUTO: 0.04 K/UL
BASOPHILS NFR BLD AUTO: 0.9 %
BILIRUB SERPL-MCNC: 0.8 MG/DL
BUN SERPL-MCNC: 13 MG/DL
C TRACH RRNA SPEC QL NAA+PROBE: NOT DETECTED
CALCIUM SERPL-MCNC: 10.4 MG/DL
CHLORIDE SERPL-SCNC: 97 MMOL/L
CHOLEST SERPL-MCNC: 174 MG/DL
CO2 SERPL-SCNC: 26 MMOL/L
CREAT SERPL-MCNC: 1.34 MG/DL
EGFR: 71 ML/MIN/1.73M2
EOSINOPHIL # BLD AUTO: 0.14 K/UL
EOSINOPHIL NFR BLD AUTO: 3.2 %
ESTIMATED AVERAGE GLUCOSE: 120 MG/DL
FOLATE SERPL-MCNC: >20 NG/ML
GLUCOSE SERPL-MCNC: 77 MG/DL
HBA1C MFR BLD HPLC: 5.8 %
HCT VFR BLD CALC: 45.3 %
HCV AB SER QL: NONREACTIVE
HCV S/CO RATIO: 0.07 S/CO
HDLC SERPL-MCNC: 51 MG/DL
HGB BLD-MCNC: 14.3 G/DL
HIV1+2 AB SPEC QL IA.RAPID: NONREACTIVE
HSV 1+2 IGG SER IA-IMP: POSITIVE
HSV 1+2 IGG SER IA-IMP: POSITIVE
HSV1 IGG SER QL: 44.7 INDEX
HSV2 IGG SER QL: 1.76 INDEX
IMM GRANULOCYTES NFR BLD AUTO: 0.2 %
LDLC SERPL CALC-MCNC: 104 MG/DL
LYMPHOCYTES # BLD AUTO: 1.94 K/UL
LYMPHOCYTES NFR BLD AUTO: 44.7 %
MAGNESIUM SERPL-MCNC: 2.3 MG/DL
MAN DIFF?: NORMAL
MCHC RBC-ENTMCNC: 26 PG
MCHC RBC-ENTMCNC: 31.6 GM/DL
MCV RBC AUTO: 82.2 FL
MONOCYTES # BLD AUTO: 0.46 K/UL
MONOCYTES NFR BLD AUTO: 10.6 %
N GONORRHOEA RRNA SPEC QL NAA+PROBE: NOT DETECTED
NEUTROPHILS # BLD AUTO: 1.75 K/UL
NEUTROPHILS NFR BLD AUTO: 40.4 %
NONHDLC SERPL-MCNC: 122 MG/DL
PLATELET # BLD AUTO: 290 K/UL
POTASSIUM SERPL-SCNC: 4.8 MMOL/L
PROT SERPL-MCNC: 8.2 G/DL
RBC # BLD: 5.51 M/UL
RBC # FLD: 14.2 %
SODIUM SERPL-SCNC: 140 MMOL/L
SOURCE AMPLIFICATION: NORMAL
T PALLIDUM AB SER QL IA: NEGATIVE
TRIGL SERPL-MCNC: 101 MG/DL
TSH SERPL-ACNC: 2.22 UIU/ML
VIT B12 SERPL-MCNC: 566 PG/ML
WBC # FLD AUTO: 4.34 K/UL

## 2024-08-28 ENCOUNTER — RX CHANGE (OUTPATIENT)
Age: 34
End: 2024-08-28

## 2024-08-28 RX ORDER — HYDROCHLOROTHIAZIDE 25 MG/1
25 TABLET ORAL
Qty: 90 | Refills: 2 | Status: ACTIVE | COMMUNITY
Start: 1900-01-01 | End: 1900-01-01

## 2024-10-29 ENCOUNTER — RX RENEWAL (OUTPATIENT)
Age: 34
End: 2024-10-29

## 2025-02-18 ENCOUNTER — APPOINTMENT (OUTPATIENT)
Dept: INTERNAL MEDICINE | Facility: CLINIC | Age: 35
End: 2025-02-18
Payer: COMMERCIAL

## 2025-02-18 ENCOUNTER — NON-APPOINTMENT (OUTPATIENT)
Age: 35
End: 2025-02-18

## 2025-02-18 VITALS
BODY MASS INDEX: 30.22 KG/M2 | SYSTOLIC BLOOD PRESSURE: 160 MMHG | OXYGEN SATURATION: 99 % | HEIGHT: 73 IN | DIASTOLIC BLOOD PRESSURE: 96 MMHG | RESPIRATION RATE: 16 BRPM | WEIGHT: 228 LBS | HEART RATE: 111 BPM

## 2025-02-18 VITALS — SYSTOLIC BLOOD PRESSURE: 144 MMHG | DIASTOLIC BLOOD PRESSURE: 86 MMHG

## 2025-02-18 DIAGNOSIS — I10 ESSENTIAL (PRIMARY) HYPERTENSION: ICD-10-CM

## 2025-02-18 DIAGNOSIS — R79.89 OTHER SPECIFIED ABNORMAL FINDINGS OF BLOOD CHEMISTRY: ICD-10-CM

## 2025-02-18 DIAGNOSIS — E53.8 DEFICIENCY OF OTHER SPECIFIED B GROUP VITAMINS: ICD-10-CM

## 2025-02-18 DIAGNOSIS — M54.16 RADICULOPATHY, LUMBAR REGION: ICD-10-CM

## 2025-02-18 DIAGNOSIS — E55.9 VITAMIN D DEFICIENCY, UNSPECIFIED: ICD-10-CM

## 2025-02-18 DIAGNOSIS — R73.09 OTHER ABNORMAL GLUCOSE: ICD-10-CM

## 2025-02-18 DIAGNOSIS — E87.6 HYPOKALEMIA: ICD-10-CM

## 2025-02-18 DIAGNOSIS — E66.9 OBESITY, UNSPECIFIED: ICD-10-CM

## 2025-02-18 DIAGNOSIS — Z20.9 CONTACT WITH AND (SUSPECTED) EXPOSURE TO UNSPECIFIED COMMUNICABLE DISEASE: ICD-10-CM

## 2025-02-18 PROCEDURE — 99214 OFFICE O/P EST MOD 30 MIN: CPT | Mod: 25

## 2025-02-18 PROCEDURE — 93000 ELECTROCARDIOGRAM COMPLETE: CPT

## 2025-02-19 LAB
25(OH)D3 SERPL-MCNC: 23.9 NG/ML
ALBUMIN SERPL ELPH-MCNC: 4.8 G/DL
ALP BLD-CCNC: 82 U/L
ALT SERPL-CCNC: 22 U/L
ANION GAP SERPL CALC-SCNC: 13 MMOL/L
AST SERPL-CCNC: 19 U/L
BASOPHILS # BLD AUTO: 0.05 K/UL
BASOPHILS NFR BLD AUTO: 1 %
BILIRUB SERPL-MCNC: 0.8 MG/DL
BUN SERPL-MCNC: 10 MG/DL
C TRACH RRNA SPEC QL NAA+PROBE: NOT DETECTED
CALCIUM SERPL-MCNC: 10.5 MG/DL
CHLORIDE SERPL-SCNC: 99 MMOL/L
CHOLEST SERPL-MCNC: 200 MG/DL
CO2 SERPL-SCNC: 29 MMOL/L
CREAT SERPL-MCNC: 1.29 MG/DL
EGFR: 74 ML/MIN/1.73M2
EOSINOPHIL # BLD AUTO: 0.15 K/UL
EOSINOPHIL NFR BLD AUTO: 3 %
ESTIMATED AVERAGE GLUCOSE: 120 MG/DL
FOLATE SERPL-MCNC: >20 NG/ML
GLUCOSE SERPL-MCNC: 103 MG/DL
HBA1C MFR BLD HPLC: 5.8 %
HBV SURFACE AB SERPL IA-ACNC: 174.1 MIU/ML
HCT VFR BLD CALC: 45.4 %
HCV AB SER QL: NONREACTIVE
HCV S/CO RATIO: 0.07 S/CO
HDLC SERPL-MCNC: 54 MG/DL
HEPATITIS A IGG ANTIBODY: NONREACTIVE
HGB BLD-MCNC: 14.2 G/DL
HSV 1+2 IGG SER IA-IMP: POSITIVE
HSV 1+2 IGG SER IA-IMP: POSITIVE
HSV1 IGG SER QL: 41.8 INDEX
HSV2 IGG SER QL: 1.95 INDEX
IMM GRANULOCYTES NFR BLD AUTO: 0.2 %
LDLC SERPL CALC-MCNC: 122 MG/DL
LYMPHOCYTES # BLD AUTO: 2.02 K/UL
LYMPHOCYTES NFR BLD AUTO: 40.1 %
MAGNESIUM SERPL-MCNC: 2 MG/DL
MAN DIFF?: NORMAL
MCHC RBC-ENTMCNC: 25.8 PG
MCHC RBC-ENTMCNC: 31.3 G/DL
MCV RBC AUTO: 82.4 FL
MONOCYTES # BLD AUTO: 0.56 K/UL
MONOCYTES NFR BLD AUTO: 11.1 %
N GONORRHOEA RRNA SPEC QL NAA+PROBE: NOT DETECTED
NEUTROPHILS # BLD AUTO: 2.25 K/UL
NEUTROPHILS NFR BLD AUTO: 44.6 %
NONHDLC SERPL-MCNC: 146 MG/DL
PLATELET # BLD AUTO: 303 K/UL
POTASSIUM SERPL-SCNC: 3.8 MMOL/L
PROT SERPL-MCNC: 8.2 G/DL
RBC # BLD: 5.51 M/UL
RBC # FLD: 14.3 %
SODIUM SERPL-SCNC: 141 MMOL/L
SOURCE AMPLIFICATION: NORMAL
T PALLIDUM AB SER QL IA: NEGATIVE
TRIGL SERPL-MCNC: 131 MG/DL
TSH SERPL-ACNC: 2 UIU/ML
VIT B12 SERPL-MCNC: 441 PG/ML
WBC # FLD AUTO: 5.04 K/UL

## 2025-02-20 LAB — HIV1+2 AB SPEC QL IA.RAPID: NONREACTIVE

## 2025-05-30 ENCOUNTER — RX RENEWAL (OUTPATIENT)
Age: 35
End: 2025-05-30

## 2025-08-18 ENCOUNTER — APPOINTMENT (OUTPATIENT)
Dept: INTERNAL MEDICINE | Facility: CLINIC | Age: 35
End: 2025-08-18
Payer: COMMERCIAL

## 2025-08-18 VITALS — DIASTOLIC BLOOD PRESSURE: 72 MMHG | SYSTOLIC BLOOD PRESSURE: 136 MMHG

## 2025-08-18 VITALS
DIASTOLIC BLOOD PRESSURE: 78 MMHG | OXYGEN SATURATION: 98 % | HEART RATE: 99 BPM | RESPIRATION RATE: 16 BRPM | HEIGHT: 73 IN | BODY MASS INDEX: 30.22 KG/M2 | WEIGHT: 228 LBS | SYSTOLIC BLOOD PRESSURE: 140 MMHG

## 2025-08-18 DIAGNOSIS — R73.09 OTHER ABNORMAL GLUCOSE: ICD-10-CM

## 2025-08-18 DIAGNOSIS — E87.6 HYPOKALEMIA: ICD-10-CM

## 2025-08-18 DIAGNOSIS — E66.9 OBESITY, UNSPECIFIED: ICD-10-CM

## 2025-08-18 DIAGNOSIS — E78.00 PURE HYPERCHOLESTEROLEMIA, UNSPECIFIED: ICD-10-CM

## 2025-08-18 DIAGNOSIS — E55.9 VITAMIN D DEFICIENCY, UNSPECIFIED: ICD-10-CM

## 2025-08-18 DIAGNOSIS — R79.89 OTHER SPECIFIED ABNORMAL FINDINGS OF BLOOD CHEMISTRY: ICD-10-CM

## 2025-08-18 DIAGNOSIS — Z20.9 CONTACT WITH AND (SUSPECTED) EXPOSURE TO UNSPECIFIED COMMUNICABLE DISEASE: ICD-10-CM

## 2025-08-18 DIAGNOSIS — I10 ESSENTIAL (PRIMARY) HYPERTENSION: ICD-10-CM

## 2025-08-18 DIAGNOSIS — E53.8 DEFICIENCY OF OTHER SPECIFIED B GROUP VITAMINS: ICD-10-CM

## 2025-08-18 DIAGNOSIS — M54.16 RADICULOPATHY, LUMBAR REGION: ICD-10-CM

## 2025-08-18 PROCEDURE — 99214 OFFICE O/P EST MOD 30 MIN: CPT | Mod: 25

## 2025-08-19 LAB
25(OH)D3 SERPL-MCNC: 32.6 NG/ML
ALBUMIN SERPL ELPH-MCNC: 4.7 G/DL
ALP BLD-CCNC: 76 U/L
ALT SERPL-CCNC: 28 U/L
ANION GAP SERPL CALC-SCNC: 13 MMOL/L
AST SERPL-CCNC: 30 U/L
BASOPHILS # BLD AUTO: 0.04 K/UL
BASOPHILS NFR BLD AUTO: 1.1 %
BILIRUB SERPL-MCNC: 0.7 MG/DL
BUN SERPL-MCNC: 16 MG/DL
CALCIUM SERPL-MCNC: 10.2 MG/DL
CHLORIDE SERPL-SCNC: 102 MMOL/L
CHOLEST SERPL-MCNC: 172 MG/DL
CO2 SERPL-SCNC: 24 MMOL/L
CREAT SERPL-MCNC: 1.07 MG/DL
EGFRCR SERPLBLD CKD-EPI 2021: 92 ML/MIN/1.73M2
EOSINOPHIL # BLD AUTO: 0.17 K/UL
EOSINOPHIL NFR BLD AUTO: 4.6 %
ESTIMATED AVERAGE GLUCOSE: 117 MG/DL
FOLATE SERPL-MCNC: >20 NG/ML
GLUCOSE SERPL-MCNC: 108 MG/DL
HBA1C MFR BLD HPLC: 5.7 %
HCT VFR BLD CALC: 43.3 %
HCV AB SER QL: NONREACTIVE
HCV S/CO RATIO: 0.09 S/CO
HDLC SERPL-MCNC: 47 MG/DL
HGB BLD-MCNC: 13.4 G/DL
HIV1+2 AB SPEC QL IA.RAPID: NONREACTIVE
HSV 1+2 IGG SER IA-IMP: POSITIVE
HSV 1+2 IGG SER IA-IMP: POSITIVE
HSV1 IGG SER QL: 38.9 INDEX
HSV2 IGG SER QL: 1.91 INDEX
IMM GRANULOCYTES NFR BLD AUTO: 0.3 %
LDLC SERPL-MCNC: 113 MG/DL
LYMPHOCYTES # BLD AUTO: 1.45 K/UL
LYMPHOCYTES NFR BLD AUTO: 39 %
MAGNESIUM SERPL-MCNC: 2.1 MG/DL
MAN DIFF?: NORMAL
MCHC RBC-ENTMCNC: 26 PG
MCHC RBC-ENTMCNC: 30.9 G/DL
MCV RBC AUTO: 83.9 FL
MONOCYTES # BLD AUTO: 0.46 K/UL
MONOCYTES NFR BLD AUTO: 12.4 %
NEUTROPHILS # BLD AUTO: 1.59 K/UL
NEUTROPHILS NFR BLD AUTO: 42.6 %
NONHDLC SERPL-MCNC: 125 MG/DL
PLATELET # BLD AUTO: 288 K/UL
POTASSIUM SERPL-SCNC: 4.4 MMOL/L
PROT SERPL-MCNC: 7.9 G/DL
RBC # BLD: 5.16 M/UL
RBC # FLD: 14.9 %
SODIUM SERPL-SCNC: 140 MMOL/L
T PALLIDUM AB SER QL IA: NEGATIVE
TRIGL SERPL-MCNC: 66 MG/DL
VIT B12 SERPL-MCNC: 440 PG/ML
WBC # FLD AUTO: 3.72 K/UL